# Patient Record
Sex: FEMALE | Race: WHITE | Employment: FULL TIME | URBAN - NONMETROPOLITAN AREA
[De-identification: names, ages, dates, MRNs, and addresses within clinical notes are randomized per-mention and may not be internally consistent; named-entity substitution may affect disease eponyms.]

---

## 2017-09-20 ENCOUNTER — HOSPITAL ENCOUNTER (EMERGENCY)
Age: 42
Discharge: HOME | End: 2017-09-20
Payer: OTHER MISCELLANEOUS

## 2017-09-20 ENCOUNTER — APPOINTMENT (OUTPATIENT)
Dept: CT IMAGING | Age: 42
End: 2017-09-20
Payer: OTHER MISCELLANEOUS

## 2017-09-20 ENCOUNTER — APPOINTMENT (OUTPATIENT)
Dept: GENERAL RADIOLOGY | Age: 42
End: 2017-09-20
Payer: OTHER MISCELLANEOUS

## 2017-09-20 VITALS
TEMPERATURE: 98.1 F | DIASTOLIC BLOOD PRESSURE: 87 MMHG | WEIGHT: 170 LBS | OXYGEN SATURATION: 99 % | HEART RATE: 67 BPM | BODY MASS INDEX: 25.76 KG/M2 | HEIGHT: 68 IN | SYSTOLIC BLOOD PRESSURE: 118 MMHG | RESPIRATION RATE: 18 BRPM

## 2017-09-20 DIAGNOSIS — G44.209 ACUTE NON INTRACTABLE TENSION-TYPE HEADACHE: ICD-10-CM

## 2017-09-20 DIAGNOSIS — M62.838 CERVICAL PARASPINAL MUSCLE SPASM: Primary | ICD-10-CM

## 2017-09-20 PROCEDURE — 99284 EMERGENCY DEPT VISIT MOD MDM: CPT

## 2017-09-20 PROCEDURE — 6360000002 HC RX W HCPCS: Performed by: PHYSICIAN ASSISTANT

## 2017-09-20 PROCEDURE — 70450 CT HEAD/BRAIN W/O DYE: CPT

## 2017-09-20 PROCEDURE — 6370000000 HC RX 637 (ALT 250 FOR IP): Performed by: PHYSICIAN ASSISTANT

## 2017-09-20 PROCEDURE — 72100 X-RAY EXAM L-S SPINE 2/3 VWS: CPT

## 2017-09-20 PROCEDURE — 72072 X-RAY EXAM THORAC SPINE 3VWS: CPT

## 2017-09-20 PROCEDURE — 72125 CT NECK SPINE W/O DYE: CPT

## 2017-09-20 RX ORDER — ONDANSETRON 4 MG/1
4 TABLET, ORALLY DISINTEGRATING ORAL ONCE
Status: COMPLETED | OUTPATIENT
Start: 2017-09-20 | End: 2017-09-20

## 2017-09-20 RX ORDER — METHYLPREDNISOLONE 4 MG/1
TABLET ORAL
Qty: 1 KIT | Refills: 0 | Status: SHIPPED | OUTPATIENT
Start: 2017-09-20 | End: 2017-09-26

## 2017-09-20 RX ORDER — TRAMADOL HYDROCHLORIDE 50 MG/1
50 TABLET ORAL EVERY 6 HOURS PRN
Qty: 20 TABLET | Refills: 0 | Status: SHIPPED | OUTPATIENT
Start: 2017-09-20 | End: 2017-09-30

## 2017-09-20 RX ORDER — CYCLOBENZAPRINE HCL 10 MG
10 TABLET ORAL ONCE
Status: COMPLETED | OUTPATIENT
Start: 2017-09-20 | End: 2017-09-20

## 2017-09-20 RX ORDER — CYCLOBENZAPRINE HCL 10 MG
10 TABLET ORAL 3 TIMES DAILY PRN
Qty: 30 TABLET | Refills: 0 | Status: SHIPPED | OUTPATIENT
Start: 2017-09-20 | End: 2017-09-30

## 2017-09-20 RX ORDER — DIPHENHYDRAMINE HCL 25 MG
25 TABLET ORAL ONCE
Status: COMPLETED | OUTPATIENT
Start: 2017-09-20 | End: 2017-09-20

## 2017-09-20 RX ORDER — TRAMADOL HYDROCHLORIDE 50 MG/1
50 TABLET ORAL ONCE
Status: COMPLETED | OUTPATIENT
Start: 2017-09-20 | End: 2017-09-20

## 2017-09-20 RX ADMIN — TRAMADOL HYDROCHLORIDE 50 MG: 50 TABLET, FILM COATED ORAL at 17:07

## 2017-09-20 RX ADMIN — ONDANSETRON 4 MG: 4 TABLET, ORALLY DISINTEGRATING ORAL at 17:07

## 2017-09-20 RX ADMIN — DIPHENHYDRAMINE HCL 25 MG: 25 TABLET ORAL at 17:07

## 2017-09-20 RX ADMIN — CYCLOBENZAPRINE HYDROCHLORIDE 10 MG: 10 TABLET, FILM COATED ORAL at 17:07

## 2017-09-20 ASSESSMENT — PAIN DESCRIPTION - LOCATION: LOCATION: NECK;BACK

## 2017-09-20 ASSESSMENT — ENCOUNTER SYMPTOMS
EYE PAIN: 0
VOMITING: 0
DIARRHEA: 0
EYE DISCHARGE: 0
WHEEZING: 0
COUGH: 0
BACK PAIN: 1
CONSTIPATION: 0
RHINORRHEA: 0
SORE THROAT: 0
ABDOMINAL PAIN: 0
SHORTNESS OF BREATH: 0
PHOTOPHOBIA: 0
NAUSEA: 0
COLOR CHANGE: 0

## 2017-09-20 ASSESSMENT — PAIN DESCRIPTION - DESCRIPTORS: DESCRIPTORS: ACHING

## 2017-09-20 ASSESSMENT — PAIN SCALES - GENERAL
PAINLEVEL_OUTOF10: 8
PAINLEVEL_OUTOF10: 8

## 2017-09-20 ASSESSMENT — PAIN DESCRIPTION - PAIN TYPE: TYPE: ACUTE PAIN

## 2017-09-20 ASSESSMENT — PAIN DESCRIPTION - ORIENTATION: ORIENTATION: RIGHT;LEFT;UPPER

## 2018-09-14 ENCOUNTER — HOSPITAL ENCOUNTER (EMERGENCY)
Age: 43
Discharge: HOME | End: 2018-09-14
Attending: FAMILY MEDICINE
Payer: COMMERCIAL

## 2018-09-14 ENCOUNTER — APPOINTMENT (OUTPATIENT)
Dept: CT IMAGING | Age: 43
End: 2018-09-14
Payer: COMMERCIAL

## 2018-09-14 VITALS
OXYGEN SATURATION: 97 % | DIASTOLIC BLOOD PRESSURE: 91 MMHG | WEIGHT: 205 LBS | HEIGHT: 68 IN | TEMPERATURE: 98.6 F | HEART RATE: 67 BPM | SYSTOLIC BLOOD PRESSURE: 130 MMHG | BODY MASS INDEX: 31.07 KG/M2 | RESPIRATION RATE: 20 BRPM

## 2018-09-14 DIAGNOSIS — R42 DIZZINESS: Primary | ICD-10-CM

## 2018-09-14 LAB
ANION GAP SERPL CALCULATED.3IONS-SCNC: 14 MEQ/L (ref 8–16)
BASOPHILS # BLD: 0.3 %
BASOPHILS ABSOLUTE: 0 THOU/MM3 (ref 0–0.1)
BUN BLDV-MCNC: 13 MG/DL (ref 7–22)
CALCIUM SERPL-MCNC: 9.3 MG/DL (ref 8.5–10.5)
CHLORIDE BLD-SCNC: 105 MEQ/L (ref 98–111)
CO2: 21 MEQ/L (ref 23–33)
CREAT SERPL-MCNC: 0.8 MG/DL (ref 0.4–1.2)
EKG ATRIAL RATE: 60 BPM
EKG P AXIS: 27 DEGREES
EKG P-R INTERVAL: 126 MS
EKG Q-T INTERVAL: 446 MS
EKG QRS DURATION: 92 MS
EKG QTC CALCULATION (BAZETT): 446 MS
EKG R AXIS: 36 DEGREES
EKG T AXIS: 35 DEGREES
EKG VENTRICULAR RATE: 60 BPM
EOSINOPHIL # BLD: 0.6 %
EOSINOPHILS ABSOLUTE: 0.1 THOU/MM3 (ref 0–0.4)
ERYTHROCYTE [DISTWIDTH] IN BLOOD BY AUTOMATED COUNT: 12.6 % (ref 11.5–14.5)
ERYTHROCYTE [DISTWIDTH] IN BLOOD BY AUTOMATED COUNT: 41.7 FL (ref 35–45)
GFR SERPL CREATININE-BSD FRML MDRD: 77 ML/MIN/1.73M2
GLUCOSE BLD-MCNC: 97 MG/DL (ref 70–108)
HCT VFR BLD CALC: 41.6 % (ref 37–47)
HEMOGLOBIN: 14.7 GM/DL (ref 12–16)
IMMATURE GRANS (ABS): 0.02 THOU/MM3 (ref 0–0.07)
IMMATURE GRANULOCYTES: 0.2 %
LYMPHOCYTES # BLD: 25.5 %
LYMPHOCYTES ABSOLUTE: 2.2 THOU/MM3 (ref 1–4.8)
MCH RBC QN AUTO: 32 PG (ref 26–33)
MCHC RBC AUTO-ENTMCNC: 35.3 GM/DL (ref 32.2–35.5)
MCV RBC AUTO: 90.6 FL (ref 81–99)
MONOCYTES # BLD: 8.9 %
MONOCYTES ABSOLUTE: 0.8 THOU/MM3 (ref 0.4–1.3)
NUCLEATED RED BLOOD CELLS: 0 /100 WBC
OSMOLALITY CALCULATION: 279.4 MOSMOL/KG (ref 275–300)
PLATELET # BLD: 290 THOU/MM3 (ref 130–400)
PMV BLD AUTO: 9.8 FL (ref 9.4–12.4)
POTASSIUM SERPL-SCNC: 4.1 MEQ/L (ref 3.5–5.2)
RBC # BLD: 4.59 MILL/MM3 (ref 4.2–5.4)
SEG NEUTROPHILS: 64.5 %
SEGMENTED NEUTROPHILS ABSOLUTE COUNT: 5.7 THOU/MM3 (ref 1.8–7.7)
SODIUM BLD-SCNC: 140 MEQ/L (ref 135–145)
TROPONIN T: < 0.01 NG/ML
TSH SERPL DL<=0.05 MIU/L-ACNC: 1.85 UIU/ML (ref 0.4–4.2)
WBC # BLD: 8.8 THOU/MM3 (ref 4.8–10.8)

## 2018-09-14 PROCEDURE — 84484 ASSAY OF TROPONIN QUANT: CPT

## 2018-09-14 PROCEDURE — 70450 CT HEAD/BRAIN W/O DYE: CPT

## 2018-09-14 PROCEDURE — 85025 COMPLETE CBC W/AUTO DIFF WBC: CPT

## 2018-09-14 PROCEDURE — 80048 BASIC METABOLIC PNL TOTAL CA: CPT

## 2018-09-14 PROCEDURE — 93225 XTRNL ECG REC<48 HRS REC: CPT

## 2018-09-14 PROCEDURE — 93226 XTRNL ECG REC<48 HR SCAN A/R: CPT

## 2018-09-14 PROCEDURE — 93010 ELECTROCARDIOGRAM REPORT: CPT | Performed by: INTERNAL MEDICINE

## 2018-09-14 PROCEDURE — 99284 EMERGENCY DEPT VISIT MOD MDM: CPT

## 2018-09-14 PROCEDURE — 36415 COLL VENOUS BLD VENIPUNCTURE: CPT

## 2018-09-14 PROCEDURE — 84443 ASSAY THYROID STIM HORMONE: CPT

## 2018-09-14 PROCEDURE — 93005 ELECTROCARDIOGRAM TRACING: CPT | Performed by: FAMILY MEDICINE

## 2018-09-14 ASSESSMENT — ENCOUNTER SYMPTOMS
SHORTNESS OF BREATH: 0
EYE DISCHARGE: 0
BLOOD IN STOOL: 0
ABDOMINAL PAIN: 0
SINUS PRESSURE: 0

## 2018-09-14 NOTE — PROCEDURES
The skin was prepped and a holter monitor was applied. The patient was instructed on the documentation of symptoms and the purpose of the holter as well as the things to avoid while wearing the holter. The patient was instructed to remove and return the holter on 09/15/18.   The serial number of the holter that was applied is [id number]

## 2018-09-14 NOTE — ED PROVIDER NOTES
[facility]  eMERGENCY dEPARTMENT eNCOUnter          CHIEF COMPLAINT       Chief Complaint   Patient presents with    Dizziness       Nurses Notes reviewed and I agree except as noted in the HPI. HISTORY OF PRESENT ILLNESS    Randell Ovalle is a 37 y.o. female who presents to the Emergency Department for the evaluation of dizziness. The patient reports that she has experienced two dizzy spells within the past two weeks. She states that during her dizzy spells that she will suddenly start to experience dizziness and feels as if she is going to commit a syncopal episode. After her dizziness resolves, she states that she experiences left sided numbness, chest heaviness, and an intermittent headache which can last for a duration of minutes to hours. She reports that her numbness will gradually dissipate. She states that her last dizzy spell became present at 0500 today. She denies any fever, chills, tinnitus, chest pain, palpitations, polydipsia, or additional urinary symptoms. She reports to be experiencing normal bowel movements. She denies to smoke. She does not take any medication on a daily basis. She denies any known allergies. She denies any cardiac or thyroid disease. No additional symptoms or complaints at this time. REVIEW OF SYSTEMS     Review of Systems   Constitutional: Negative for diaphoresis and fever. HENT: Negative for congestion and sinus pressure. Eyes: Negative for discharge. Respiratory: Negative for shortness of breath. Nonsmoker   Cardiovascular: Negative for chest pain and palpitations. Gastrointestinal: Negative for abdominal pain and blood in stool. Endocrine: Negative for polydipsia and polyuria. Genitourinary: Negative for difficulty urinating. Musculoskeletal: Negative for joint swelling. Skin: Negative for rash. Neurological: Positive for dizziness. Negative for speech difficulty and weakness.    Hematological: Does not bruise/bleed Nursing note and vitals reviewed. DIFFERENTIAL DIAGNOSIS:   Dizziness unclear cause    Some features may suggest orthostasis    Consider cardiac arrhythmia    Look for anemia, diabetes      DIAGNOSTIC RESULTS     EKG: All EKG's are interpreted by the Emergency Department Physician who either signs or Co-signs this chart in the absence of a cardiologist.    EKG showed Sinus rhythm with rate 60. QRS complexes show Normal axis, Normal conduction. ST-T waves show No acute change            RADIOLOGY: non-plain film images(s) such as CT, Ultrasound and MRI are read by the radiologist.  The patient had a Multiple view CT scan of the brain which demonstrates Normal brain texture. Sinuses are normal.  No acute process per radiology reading      [x] Visualized and interpreted by me   [x] Radiologist's Report Reviewed   [] Discussed with Radiologist.      LABS:   Labs Reviewed   BASIC METABOLIC PANEL - Abnormal; Notable for the following:        Result Value    CO2 21 (*)     All other components within normal limits   GLOMERULAR FILTRATION RATE, ESTIMATED - Abnormal; Notable for the following:     Est, Glom Filt Rate 77 (*)     All other components within normal limits   CBC WITH AUTO DIFFERENTIAL   TSH WITH REFLEX   TROPONIN   ANION GAP   OSMOLALITY       EMERGENCY DEPARTMENT COURSE:   Vitals:    Vitals:    09/14/18 0928 09/14/18 0933 09/14/18 0948   BP: (!) 140/86  (!) 130/91   Pulse: 71     Resp: 18     Temp:  98.6 °F (37 °C)    TempSrc:  Oral    SpO2: 97%     Weight:  205 lb (93 kg)    Height:  5' 8\" (1.727 m)      9:57 AM: The patient was seen and evaluated.     Nursing notes reviewed    Blood pressure 123/71, pulse 60    Blood pressure 122/98, pulse 68 standing    No dizziness    CBC normal    Renal function and electrolytes normal    Troponin normal    TSH normal    We'll place a Holter monitor    No specific explanation for dizziness    Outpatient referral for primary care       CRITICAL CARE:

## 2018-09-18 NOTE — PROCEDURES
After Visit Summary   4/30/2018    Prakash Cramer    MRN: [id number]           Patient Information     Date Of Birth          1943        Visit Information        Provider Department      4/30/2018 2:00 PM Zhou Meraz MD [facility]        Today's Diagnoses     Type 2 diabetes mellitus with diabetic nephropathy, without long-term current use of insulin (H)          Care Instructions    Invokana 100mg tab, 1 tab daily  In AM for diabetes  Nonfasting BMP lab in 3 weeks. Call earlier to nurseline [phone] if increased frequency of urination. Otherwise give update in 4 weeks before running out of medication how sugars are doing. Check AM odd days and bedtime on even days for the 6 days prior to calling  Follow-up with Oncology/hematology  Increased bacteria in colon with yogurt that has active cultures (Greek yogurt, Yoplait)  Call if recurrent diarrhea             Follow-ups after your visit        Your next 10 appointments already scheduled     May 03, 2018 10:00 AM CDT   LAB with LAB FIRST FLOOR [facility] ([facility])    [address] [place] [address] [phone]           Please do not eat 10-12 hours before your appointment if you are coming in fasting for labs on lipids, cholesterol, or glucose (sugar). This does not apply to pregnant women. Water, hot tea and black coffee (with nothing added) are okay. Do not drink other fluids, diet soda or chew gum.            May 07, 2018  4:30 PM CDT   Office Visit with Zhou Meraz MD   [facility] ([facility])    [address] [address] [phone]           Bring a current list of meds and any records pertaining to this visit. For Physicals, please bring immunization records and any forms needing to be filled out. Please arrive 10 minutes early to complete paperwork.            May 08,  [address] [place] [address]                                  HOLTER MONITOR    PATIENT NAME: Aylin Collazo                  :        1975  MED REC NO:   [id number]                           ROOM:       006  ACCOUNT NO:   [de-identified]                           ADMIT DATE: 2018  PROVIDER:     Jefferson Eubanks. Donalee Mangle, M.D. Niki Goldmann 24-HOURS    DATE OF STUDY:  2018    DURATION:  24 hours. QUALITY:  Reasonable. INDICATION:  Irregular heart beat. FINDINGS:  The patient is in normal sinus rhythm. Average heart rate 68  beats per minute, ranging from 44 to 141 beats per minute. Maximum R to R  interval was 1.4 seconds at 08:28 a.m. There are 72 ventricular ectopic  beats, all isolated, with three bigeminy pattern and 22 supraventricular  ectopic beats, all isolated. No other form of arrhythmia. Diary presented  and few entries noted. The dizziness, the patient reported, correlates  with normal sinus rhythm. CONCLUSION:  This is a normal sinus rhythm. Average heart rate 68 beats  per minute ranging from 44 to 141 beats per minute. No significant pause  of more than 1.4 seconds noted. Rare ventricular ectopic beats, total of  72, all isolated. Rare supraventricular ectopic beats, total of 22, all  isolated. No other form of arrhythmia. From the diary, the symptom  correlates with normal sinus rhythm. [facility].  Stefany Farah M.D.    D: 2018 61:34:60       T: 2018 1:26:09     [email or web address]  Job#: [id number]     Doc#: [id number]    CC: "2018  1:30 PM CDT   LAB with LAB ONC [facility] ([facility])    [address] [place] [address] [phone]           Please do not eat 10-12 hours before your appointment if you are coming in fasting for labs on lipids, cholesterol, or glucose (sugar). This does not apply to pregnant women. Water, hot tea and black coffee (with nothing added) are okay. Do not drink other fluids, diet soda or chew gum.            May 08, 2018  2:15 PM CDT   Return Visit with Terra Salazar MD   [facility] ([facility])    [address] [place] [address] [phone]              Who to contact     If you have questions or need follow up information about today's clinic visit or your schedule please contact [facility] directly at [phone].  Normal or non-critical lab and imaging results will be communicated to you by [employer], letter or phone within 4 business days after the clinic has received the results. If you do not hear from us within 7 days, please contact the clinic through NW[employer] or phone. If you have a critical or abnormal lab result, we will notify you by phone as soon as possible.  Submit refill requests through "[employer]" or call your pharmacy and they will forward the refill request to us. Please allow 3 business days for your refill to be completed.          Additional Information About Your Visit        "[employer]" Information     "[employer]" lets you send messages to your doctor, view your test results, renew your prescriptions, schedule appointments and more. To sign up, go to [email or web address]" . Click on \"Log in\" on the left side of the screen, which will take you to the Welcome page. Then click on \"Sign up Now\" on the right side of the page.     You will be asked to enter the access code listed below, as well as some personal information. Please follow the directions to create " your username and password.     Your access code is: [email or web address]  Expires: 2018 12:07 PM     Your access code will  in 90 days. If you need help or a new code, please call your [facility] or [phone].        Care EveryWhere ID     This is your Care EveryWhere ID. This could be used by other organizations to access your [place] medical records  [id number]        Your Vitals Were     Pulse Temperature Respirations BMI (Body Mass Index)          90 98.6  F (37  C) (Oral) 16 26.31 kg/m2         Blood Pressure from Last 3 Encounters:   18 118/66   18 130/77   17 124/68    Weight from Last 3 Encounters:   18 168 lb (76.2 kg)   18 166 lb (75.3 kg)   17 170 lb (77.1 kg)              Today, you had the following     No orders found for display         Today's Medication Changes          These changes are accurate as of 18  2:49 PM.  If you have any questions, ask your nurse or doctor.               Start taking these medicines.        Dose/Directions    canagliflozin 100 MG tablet   Commonly known as:  INVOKANA   Used for:  Type 2 diabetes mellitus with diabetic nephropathy, without long-term current use of insulin (H)   Started by:  Zhou Meraz MD        Dose:  100 mg   Take 1 tablet (100 mg) by mouth every morning (before breakfast)   Quantity:  30 tablet   Refills:  11            Where to get your medicines      These medications were sent to [address]  [id number] [facility] [address]     Phone:  [phone]     blood glucose monitoring lancets    blood glucose monitoring test strip    canagliflozin 100 MG tablet                Primary Care Provider Office Phone # Fax #    Zhou Meraz [phone]       [address] [facility] [address]        Equal Access to Services     ARASH GRACE AH: Hadii tammy persaud Soomaali, waaxda luqadaha, qaybta kaalying berry  florentin juarezloree lenimartha bartlett ah. So [facility] [phone].    ATENCIÓN: Si habla lamar, tiene a stroud disposición servicios gratuitos de asistencia lingüística. Sofy al [phone].    We comply with applicable federal civil rights laws and [place] laws. We do not discriminate on the basis of race, color, national origin, age, disability, sex, sexual orientation, or gender identity.            Thank you!     Thank you for choosing [facility]  for your care. Our goal is always to provide you with excellent care. Hearing back from our patients is one way we can continue to improve our services. Please take a few minutes to complete the written survey that you may receive in the mail after your visit with us. Thank you!             Your Updated Medication List - Protect others around you: Learn how to safely use, store and throw away your medicines at www.disposemymeds.org.          This list is accurate as of 4/30/18  2:49 PM.  Always use your most recent med list.                   Brand Name Dispense Instructions for use Diagnosis    amLODIPine 10 MG tablet    NORVASC    90 tablet    Take 1 tablet (10 mg) by mouth daily    Essential hypertension       aspirin 81 MG tablet     30 tablet    Take by mouth daily    Type 2 diabetes mellitus with diabetic nephropathy, without long-term current use of insulin (H)       B-12 1000 MCG Tbcr     100 tablet    Take 1,000 mcg by mouth daily    Vitamin B12 deficiency (non anemic), Pancytopenia (H)       * blood glucose monitoring lancets     1 Box    Use to test blood sugar 1 time daily or as directed.    Type 2 diabetes mellitus with diabetic nephropathy, without long-term current use of insulin (H)       * blood glucose monitoring lancets     102 each    Use to test blood sugar 1 times daily or as directed.    Type 2 diabetes mellitus with diabetic nephropathy, without long-term current use of insulin (H)       Blood Glucose Monitoring Suppl Ami     1  each    1 Device daily Smartview Meter    Type 2 diabetes mellitus with diabetic nephropathy, without long-term current use of insulin (H)       blood glucose monitoring test strip    ACCU-CHEK SMARTVIEW    100 strip    Use to test blood sugar 1 times daily or as directed.    Type 2 diabetes mellitus with diabetic nephropathy, without long-term current use of insulin (H)       canagliflozin 100 MG tablet    INVOKANA    30 tablet    Take 1 tablet (100 mg) by mouth every morning (before breakfast)    Type 2 diabetes mellitus with diabetic nephropathy, without long-term current use of insulin (H)       levothyroxine 75 MCG tablet    SYNTHROID/LEVOTHROID    30 tablet    TAKE ONE TABLET BY MOUTH ONCE DAILY    Hypothyroidism, unspecified type       metFORMIN 1000 MG tablet    GLUCOPHAGE    180 tablet    1 tab twice a day    Type 2 diabetes mellitus with diabetic nephropathy, without long-term current use of insulin (H)       omeprazole 20 MG CR capsule    priLOSEC    90 capsule    Take 1 capsule (20 mg) by mouth every morning TAKE 30'-60' BEFORE 1ST MEAL DAILY    Gastroesophageal reflux disease without esophagitis       rosuvastatin 40 MG tablet    CRESTOR    45 tablet    Take 0.5 tablets (20 mg) by mouth At Bedtime    Hyperlipidemia LDL goal <100       sitagliptin 100 MG tablet    JANUVIA    90 tablet    Take 1 tablet (100 mg) by mouth daily    Type 2 diabetes mellitus with diabetic nephropathy, without long-term current use of insulin (H)       * Notice:  This list has 2 medication(s) that are the same as other medications prescribed for you. Read the directions carefully, and ask your doctor or other care provider to review them with you.

## 2018-12-21 ENCOUNTER — APPOINTMENT (OUTPATIENT)
Dept: GENERAL RADIOLOGY | Age: 43
End: 2018-12-21
Payer: COMMERCIAL

## 2018-12-21 ENCOUNTER — HOSPITAL ENCOUNTER (EMERGENCY)
Age: 43
Discharge: HOME | End: 2018-12-21
Attending: FAMILY MEDICINE
Payer: COMMERCIAL

## 2018-12-21 VITALS
DIASTOLIC BLOOD PRESSURE: 89 MMHG | HEART RATE: 69 BPM | SYSTOLIC BLOOD PRESSURE: 152 MMHG | OXYGEN SATURATION: 98 % | RESPIRATION RATE: 18 BRPM | TEMPERATURE: 98 F

## 2018-12-21 DIAGNOSIS — K21.9 GASTROESOPHAGEAL REFLUX DISEASE, ESOPHAGITIS PRESENCE NOT SPECIFIED: Primary | ICD-10-CM

## 2018-12-21 LAB
ALBUMIN SERPL-MCNC: 3.8 G/DL (ref 3.5–5.1)
ALP BLD-CCNC: 71 U/L (ref 38–126)
ALT SERPL-CCNC: 8 U/L (ref 11–66)
ANION GAP SERPL CALCULATED.3IONS-SCNC: 12 MEQ/L (ref 8–16)
AST SERPL-CCNC: 11 U/L (ref 5–40)
BACTERIA: ABNORMAL /HPF
BASOPHILS # BLD: 0.6 %
BASOPHILS ABSOLUTE: 0.1 THOU/MM3 (ref 0–0.1)
BILIRUB SERPL-MCNC: 0.2 MG/DL (ref 0.3–1.2)
BILIRUBIN DIRECT: < 0.2 MG/DL (ref 0–0.3)
BILIRUBIN URINE: NEGATIVE
BLOOD, URINE: NEGATIVE
BUN BLDV-MCNC: 18 MG/DL (ref 7–22)
CALCIUM SERPL-MCNC: 9.1 MG/DL (ref 8.5–10.5)
CASTS 2: ABNORMAL /LPF
CASTS UA: ABNORMAL /LPF
CHARACTER, URINE: CLEAR
CHLORIDE BLD-SCNC: 103 MEQ/L (ref 98–111)
CO2: 24 MEQ/L (ref 23–33)
COLOR: YELLOW
CREAT SERPL-MCNC: 0.8 MG/DL (ref 0.4–1.2)
CRYSTALS, UA: ABNORMAL
EKG ATRIAL RATE: 67 BPM
EKG P AXIS: 37 DEGREES
EKG P-R INTERVAL: 118 MS
EKG Q-T INTERVAL: 420 MS
EKG QRS DURATION: 88 MS
EKG QTC CALCULATION (BAZETT): 443 MS
EKG R AXIS: 44 DEGREES
EKG T AXIS: 57 DEGREES
EKG VENTRICULAR RATE: 67 BPM
EOSINOPHIL # BLD: 1.4 %
EOSINOPHILS ABSOLUTE: 0.1 THOU/MM3 (ref 0–0.4)
EPITHELIAL CELLS, UA: ABNORMAL /HPF
ERYTHROCYTE [DISTWIDTH] IN BLOOD BY AUTOMATED COUNT: 12.6 % (ref 11.5–14.5)
ERYTHROCYTE [DISTWIDTH] IN BLOOD BY AUTOMATED COUNT: 43.1 FL (ref 35–45)
GFR SERPL CREATININE-BSD FRML MDRD: 77 ML/MIN/1.73M2
GLUCOSE BLD-MCNC: 106 MG/DL (ref 70–108)
GLUCOSE URINE: NEGATIVE MG/DL
HCT VFR BLD CALC: 41.1 % (ref 37–47)
HEMOGLOBIN: 13.9 GM/DL (ref 12–16)
IMMATURE GRANS (ABS): 0.01 THOU/MM3 (ref 0–0.07)
IMMATURE GRANULOCYTES: 0.1 %
KETONES, URINE: NEGATIVE
LEUKOCYTE ESTERASE, URINE: ABNORMAL
LIPASE: 36.2 U/L (ref 5.6–51.3)
LYMPHOCYTES # BLD: 28.4 %
LYMPHOCYTES ABSOLUTE: 2.7 THOU/MM3 (ref 1–4.8)
MAGNESIUM: 2 MG/DL (ref 1.6–2.4)
MCH RBC QN AUTO: 31.4 PG (ref 26–33)
MCHC RBC AUTO-ENTMCNC: 33.8 GM/DL (ref 32.2–35.5)
MCV RBC AUTO: 92.8 FL (ref 81–99)
MISCELLANEOUS 2: ABNORMAL
MONOCYTES # BLD: 7.7 %
MONOCYTES ABSOLUTE: 0.7 THOU/MM3 (ref 0.4–1.3)
NITRITE, URINE: NEGATIVE
NUCLEATED RED BLOOD CELLS: 0 /100 WBC
OSMOLALITY CALCULATION: 279.9 MOSMOL/KG (ref 275–300)
PH UA: 7
PLATELET # BLD: 286 THOU/MM3 (ref 130–400)
PMV BLD AUTO: 10 FL (ref 9.4–12.4)
POTASSIUM SERPL-SCNC: 4 MEQ/L (ref 3.5–5.2)
PROTEIN UA: NEGATIVE
RBC # BLD: 4.43 MILL/MM3 (ref 4.2–5.4)
RBC URINE: ABNORMAL /HPF
RENAL EPITHELIAL, UA: ABNORMAL
SEG NEUTROPHILS: 61.8 %
SEGMENTED NEUTROPHILS ABSOLUTE COUNT: 5.9 THOU/MM3 (ref 1.8–7.7)
SODIUM BLD-SCNC: 139 MEQ/L (ref 135–145)
SPECIFIC GRAVITY, URINE: 1.02 (ref 1–1.03)
TOTAL PROTEIN: 6.3 G/DL (ref 6.1–8)
TROPONIN T: < 0.01 NG/ML
UROBILINOGEN, URINE: 1 EU/DL
WBC # BLD: 9.5 THOU/MM3 (ref 4.8–10.8)
WBC UA: ABNORMAL /HPF
YEAST: ABNORMAL

## 2018-12-21 PROCEDURE — C9113 INJ PANTOPRAZOLE SODIUM, VIA: HCPCS | Performed by: FAMILY MEDICINE

## 2018-12-21 PROCEDURE — 87077 CULTURE AEROBIC IDENTIFY: CPT

## 2018-12-21 PROCEDURE — 81001 URINALYSIS AUTO W/SCOPE: CPT

## 2018-12-21 PROCEDURE — 6360000002 HC RX W HCPCS: Performed by: FAMILY MEDICINE

## 2018-12-21 PROCEDURE — 83735 ASSAY OF MAGNESIUM: CPT

## 2018-12-21 PROCEDURE — 83690 ASSAY OF LIPASE: CPT

## 2018-12-21 PROCEDURE — 85025 COMPLETE CBC W/AUTO DIFF WBC: CPT

## 2018-12-21 PROCEDURE — 82248 BILIRUBIN DIRECT: CPT

## 2018-12-21 PROCEDURE — 36415 COLL VENOUS BLD VENIPUNCTURE: CPT

## 2018-12-21 PROCEDURE — 87086 URINE CULTURE/COLONY COUNT: CPT

## 2018-12-21 PROCEDURE — 99283 EMERGENCY DEPT VISIT LOW MDM: CPT

## 2018-12-21 PROCEDURE — 6370000000 HC RX 637 (ALT 250 FOR IP): Performed by: FAMILY MEDICINE

## 2018-12-21 PROCEDURE — 80053 COMPREHEN METABOLIC PANEL: CPT

## 2018-12-21 PROCEDURE — 93005 ELECTROCARDIOGRAM TRACING: CPT

## 2018-12-21 PROCEDURE — 96374 THER/PROPH/DIAG INJ IV PUSH: CPT

## 2018-12-21 PROCEDURE — 71046 X-RAY EXAM CHEST 2 VIEWS: CPT

## 2018-12-21 PROCEDURE — 87184 SC STD DISK METHOD PER PLATE: CPT

## 2018-12-21 PROCEDURE — 84484 ASSAY OF TROPONIN QUANT: CPT

## 2018-12-21 PROCEDURE — 87186 SC STD MICRODIL/AGAR DIL: CPT

## 2018-12-21 RX ORDER — PANTOPRAZOLE SODIUM 40 MG/10ML
40 INJECTION, POWDER, LYOPHILIZED, FOR SOLUTION INTRAVENOUS ONCE
Status: COMPLETED | OUTPATIENT
Start: 2018-12-21 | End: 2018-12-21

## 2018-12-21 RX ADMIN — LIDOCAINE HYDROCHLORIDE: 20 SOLUTION ORAL; TOPICAL at 21:59

## 2018-12-21 RX ADMIN — PANTOPRAZOLE SODIUM 40 MG: 40 INJECTION, POWDER, FOR SOLUTION INTRAVENOUS at 21:59

## 2018-12-21 ASSESSMENT — PAIN DESCRIPTION - PAIN TYPE: TYPE: ACUTE PAIN

## 2018-12-21 ASSESSMENT — PAIN DESCRIPTION - ORIENTATION: ORIENTATION: LEFT;UPPER

## 2018-12-21 ASSESSMENT — PAIN DESCRIPTION - LOCATION: LOCATION: ABDOMEN

## 2018-12-21 ASSESSMENT — PAIN SCALES - GENERAL: PAINLEVEL_OUTOF10: 8

## 2018-12-22 ASSESSMENT — ENCOUNTER SYMPTOMS
PHOTOPHOBIA: 0
WHEEZING: 0
BACK PAIN: 0
TROUBLE SWALLOWING: 0
CONSTIPATION: 0
CHEST TIGHTNESS: 0
ABDOMINAL DISTENTION: 0
ABDOMINAL PAIN: 0
VOMITING: 0
DIARRHEA: 0
STRIDOR: 0
SHORTNESS OF BREATH: 0
NAUSEA: 0
SINUS PAIN: 0
SORE THROAT: 0

## 2018-12-24 LAB
ORGANISM: ABNORMAL
URINE CULTURE REFLEX: ABNORMAL

## 2018-12-27 ENCOUNTER — TELEPHONE (OUTPATIENT)
Dept: PHARMACY | Age: 43
End: 2018-12-27

## 2019-07-21 ENCOUNTER — HOSPITAL ENCOUNTER (EMERGENCY)
Dept: GENERAL RADIOLOGY | Age: 44
Discharge: HOME | End: 2019-07-21
Payer: COMMERCIAL

## 2019-07-21 ENCOUNTER — HOSPITAL ENCOUNTER (EMERGENCY)
Age: 44
Discharge: HOME | End: 2019-07-21
Payer: COMMERCIAL

## 2019-07-21 VITALS
BODY MASS INDEX: 31.39 KG/M2 | OXYGEN SATURATION: 96 % | WEIGHT: 200 LBS | SYSTOLIC BLOOD PRESSURE: 141 MMHG | HEART RATE: 70 BPM | RESPIRATION RATE: 16 BRPM | HEIGHT: 67 IN | DIASTOLIC BLOOD PRESSURE: 61 MMHG | TEMPERATURE: 98.1 F

## 2019-07-21 DIAGNOSIS — S93.401A SPRAIN OF RIGHT ANKLE, UNSPECIFIED LIGAMENT, INITIAL ENCOUNTER: Primary | ICD-10-CM

## 2019-07-21 PROCEDURE — 99203 OFFICE O/P NEW LOW 30 MIN: CPT | Performed by: NURSE PRACTITIONER

## 2019-07-21 PROCEDURE — 2709999900 HC NON-CHARGEABLE SUPPLY

## 2019-07-21 PROCEDURE — 99213 OFFICE O/P EST LOW 20 MIN: CPT

## 2019-07-21 PROCEDURE — 73610 X-RAY EXAM OF ANKLE: CPT

## 2019-07-21 ASSESSMENT — ENCOUNTER SYMPTOMS
BACK PAIN: 0
COLOR CHANGE: 1

## 2019-07-21 ASSESSMENT — PAIN DESCRIPTION - LOCATION: LOCATION: ANKLE

## 2019-07-21 ASSESSMENT — PAIN SCALES - GENERAL: PAINLEVEL_OUTOF10: 8

## 2019-07-21 NOTE — ED PROVIDER NOTES
%,Estimated body mass index is 31.32 kg/m² as calculated from the following:    Height as of this encounter: 5' 7\" (1.702 m). Weight as of this encounter: 200 lb (90.7 kg). ,No LMP recorded. Patient has had an ablation. Physical Exam   Constitutional: She is oriented to person, place, and time. Vital signs are normal. She appears well-developed and well-nourished. She is active and cooperative. Non-toxic appearance. She does not have a sickly appearance. She does not appear ill. No distress. HENT:   Head: Normocephalic and atraumatic. Cardiovascular: Normal rate. Pulmonary/Chest: Effort normal.   Musculoskeletal:        Right ankle: She exhibits swelling. She exhibits normal range of motion, no ecchymosis, no deformity, no laceration and normal pulse. Tenderness. Lateral malleolus tenderness found. No medial malleolus, no AITFL, no CF ligament, no posterior TFL, no head of 5th metatarsal and no proximal fibula tenderness found. Neurological: She is alert and oriented to person, place, and time. Skin: Skin is warm, dry and intact. No rash noted. Nursing note and vitals reviewed. DIAGNOSTIC RESULTS     Labs:No results found for this visit on 07/21/19. IMAGING:    XR ANKLE RIGHT (MIN 3 VIEWS)   Final Result   1. No acute process. **This report has been created using voice recognition software. It may contain minor errors which are inherent in voice recognition technology. **      Final report electronically signed by Dr. Kyle Jalloh on 7/21/2019 4:29 PM            EKG:      URGENT CARE COURSE:     Vitals:    07/21/19 1558   BP: (!) 141/61   Pulse: 70   Resp: 16   Temp: 98.1 °F (36.7 °C)   TempSrc: Temporal   SpO2: 96%   Weight: 200 lb (90.7 kg)   Height: 5' 7\" (1.702 m)       Medications - No data to display         PROCEDURES:  None    FINAL IMPRESSION      1.  Sprain of right ankle, unspecified ligament, initial encounter          DISPOSITION/ PLAN     The patient's physical exam

## 2020-05-29 ENCOUNTER — HOSPITAL ENCOUNTER (EMERGENCY)
Age: 45
Discharge: HOME | End: 2020-05-29
Payer: COMMERCIAL

## 2020-05-29 VITALS
DIASTOLIC BLOOD PRESSURE: 86 MMHG | HEIGHT: 67 IN | RESPIRATION RATE: 12 BRPM | OXYGEN SATURATION: 99 % | SYSTOLIC BLOOD PRESSURE: 121 MMHG | HEART RATE: 62 BPM | WEIGHT: 200 LBS | TEMPERATURE: 98.1 F | BODY MASS INDEX: 31.39 KG/M2

## 2020-05-29 LAB
ALBUMIN SERPL-MCNC: 4.1 G/DL (ref 3.5–5.1)
ALP BLD-CCNC: 69 U/L (ref 38–126)
ALT SERPL-CCNC: 10 U/L (ref 11–66)
ANION GAP SERPL CALCULATED.3IONS-SCNC: 9 MEQ/L (ref 8–16)
AST SERPL-CCNC: 13 U/L (ref 5–40)
BASOPHILS # BLD: 0.5 %
BASOPHILS ABSOLUTE: 0.1 THOU/MM3 (ref 0–0.1)
BILIRUB SERPL-MCNC: 0.3 MG/DL (ref 0.3–1.2)
BUN BLDV-MCNC: 16 MG/DL (ref 7–22)
CALCIUM SERPL-MCNC: 9.2 MG/DL (ref 8.5–10.5)
CHLORIDE BLD-SCNC: 103 MEQ/L (ref 98–111)
CO2: 26 MEQ/L (ref 23–33)
CREAT SERPL-MCNC: 1 MG/DL (ref 0.4–1.2)
EKG ATRIAL RATE: 59 BPM
EKG P AXIS: 39 DEGREES
EKG P-R INTERVAL: 132 MS
EKG Q-T INTERVAL: 442 MS
EKG QRS DURATION: 86 MS
EKG QTC CALCULATION (BAZETT): 437 MS
EKG R AXIS: 40 DEGREES
EKG T AXIS: 44 DEGREES
EKG VENTRICULAR RATE: 59 BPM
EOSINOPHIL # BLD: 1.7 %
EOSINOPHILS ABSOLUTE: 0.2 THOU/MM3 (ref 0–0.4)
ERYTHROCYTE [DISTWIDTH] IN BLOOD BY AUTOMATED COUNT: 12.6 % (ref 11.5–14.5)
ERYTHROCYTE [DISTWIDTH] IN BLOOD BY AUTOMATED COUNT: 42.5 FL (ref 35–45)
GFR SERPL CREATININE-BSD FRML MDRD: 59 ML/MIN/1.73M2
GLUCOSE BLD-MCNC: 95 MG/DL (ref 70–108)
HCT VFR BLD CALC: 40.6 % (ref 37–47)
HEMOGLOBIN: 13.9 GM/DL (ref 12–16)
IMMATURE GRANS (ABS): 0.03 THOU/MM3 (ref 0–0.07)
IMMATURE GRANULOCYTES: 0.3 %
LYMPHOCYTES # BLD: 25.3 %
LYMPHOCYTES ABSOLUTE: 2.7 THOU/MM3 (ref 1–4.8)
MCH RBC QN AUTO: 31.7 PG (ref 26–33)
MCHC RBC AUTO-ENTMCNC: 34.2 GM/DL (ref 32.2–35.5)
MCV RBC AUTO: 92.7 FL (ref 81–99)
MONOCYTES # BLD: 7.8 %
MONOCYTES ABSOLUTE: 0.8 THOU/MM3 (ref 0.4–1.3)
NUCLEATED RED BLOOD CELLS: 0 /100 WBC
OSMOLALITY CALCULATION: 276.7 MOSMOL/KG (ref 275–300)
PLATELET # BLD: 296 THOU/MM3 (ref 130–400)
PMV BLD AUTO: 10.2 FL (ref 9.4–12.4)
POTASSIUM SERPL-SCNC: 3.6 MEQ/L (ref 3.5–5.2)
RBC # BLD: 4.38 MILL/MM3 (ref 4.2–5.4)
SEG NEUTROPHILS: 64.4 %
SEGMENTED NEUTROPHILS ABSOLUTE COUNT: 6.9 THOU/MM3 (ref 1.8–7.7)
SODIUM BLD-SCNC: 138 MEQ/L (ref 135–145)
TOTAL PROTEIN: 7.1 G/DL (ref 6.1–8)
TROPONIN T: < 0.01 NG/ML
WBC # BLD: 10.7 THOU/MM3 (ref 4.8–10.8)

## 2020-05-29 PROCEDURE — 93005 ELECTROCARDIOGRAM TRACING: CPT | Performed by: FAMILY MEDICINE

## 2020-05-29 PROCEDURE — 36415 COLL VENOUS BLD VENIPUNCTURE: CPT

## 2020-05-29 PROCEDURE — 99283 EMERGENCY DEPT VISIT LOW MDM: CPT

## 2020-05-29 PROCEDURE — 93010 ELECTROCARDIOGRAM REPORT: CPT | Performed by: INTERNAL MEDICINE

## 2020-05-29 PROCEDURE — 85025 COMPLETE CBC W/AUTO DIFF WBC: CPT

## 2020-05-29 PROCEDURE — 84484 ASSAY OF TROPONIN QUANT: CPT

## 2020-05-29 PROCEDURE — 80053 COMPREHEN METABOLIC PANEL: CPT

## 2020-05-29 NOTE — ED TRIAGE NOTES
Pt to ED via private vehicle w/rprts of lightheadedness and dizziness while at work. Felt \"really hot, like I was going to pass out. \" Work called squad and recommended she were to be evaluated in ER. She then went to urgent care who sent her to ER. Pt rprts symptoms since improved. Pt denies all pains. Rprts intermittent SOB. Pt placed on monitor and in gown. EKG completed.

## 2020-06-02 ASSESSMENT — ENCOUNTER SYMPTOMS
NAUSEA: 0
CHEST TIGHTNESS: 0
TROUBLE SWALLOWING: 0
RHINORRHEA: 0
SINUS PAIN: 0
PHOTOPHOBIA: 0
VOMITING: 0
COLOR CHANGE: 0
COUGH: 0
CONSTIPATION: 0
SORE THROAT: 0
DIARRHEA: 0
ABDOMINAL PAIN: 0
SINUS PRESSURE: 0
BACK PAIN: 0
WHEEZING: 0
SHORTNESS OF BREATH: 0

## 2020-06-02 NOTE — ED PROVIDER NOTES
is not nervous/anxious. PAST MEDICAL HISTORY    has a past medical history of Migraine. SURGICAL HISTORY      has a past surgical history that includes Tonsillectomy and Tubal ligation. CURRENT MEDICATIONS       Discharge Medication List as of 5/29/2020  5:29 PM      CONTINUE these medications which have NOT CHANGED    Details   ibuprofen (ADVIL;MOTRIN) 600 MG tablet Take 1 tablet by mouth every 8 hours as needed for Pain, Disp-30 tablet, R-0Take with food. ALLERGIES     has No Known Allergies. FAMILY HISTORY     has no family status information on file. family history is not on file. SOCIAL HISTORY      reports that she has never smoked. She has never used smokeless tobacco. She reports current alcohol use. She reports that she does not use drugs. PHYSICAL EXAM     INITIAL VITALS:  height is 5' 7\" (1.702 m) and weight is 200 lb (90.7 kg). Her oral temperature is 98.1 °F (36.7 °C). Her blood pressure is 121/86 and her pulse is 62. Her respiration is 12 and oxygen saturation is 99%. Physical Exam  Vitals signs and nursing note reviewed. Constitutional:       General: She is awake. She is not in acute distress. Appearance: Normal appearance. She is well-developed. She is obese. She is not ill-appearing, toxic-appearing or diaphoretic. HENT:      Head: Normocephalic and atraumatic. Right Ear: External ear normal.      Left Ear: External ear normal.      Nose: Nose normal.      Mouth/Throat:      Mouth: Mucous membranes are moist.      Pharynx: Oropharynx is clear. Eyes:      Extraocular Movements: Extraocular movements intact. Pupils: Pupils are equal, round, and reactive to light. Neck:      Musculoskeletal: Normal range of motion and neck supple. No neck rigidity or muscular tenderness. Vascular: No carotid bruit. Cardiovascular:      Rate and Rhythm: Normal rate and regular rhythm. Pulses: Normal pulses.       Heart sounds: Normal heart sounds,

## 2021-12-13 ENCOUNTER — APPOINTMENT (OUTPATIENT)
Dept: GENERAL RADIOLOGY | Age: 46
End: 2021-12-13
Payer: COMMERCIAL

## 2021-12-13 ENCOUNTER — HOSPITAL ENCOUNTER (EMERGENCY)
Age: 46
Discharge: HOME | End: 2021-12-13
Payer: COMMERCIAL

## 2021-12-13 VITALS
WEIGHT: 200 LBS | DIASTOLIC BLOOD PRESSURE: 82 MMHG | BODY MASS INDEX: 31.32 KG/M2 | RESPIRATION RATE: 16 BRPM | TEMPERATURE: 97.9 F | HEART RATE: 98 BPM | SYSTOLIC BLOOD PRESSURE: 136 MMHG | OXYGEN SATURATION: 98 %

## 2021-12-13 DIAGNOSIS — G89.29 CHRONIC PAIN OF LEFT ANKLE: Primary | ICD-10-CM

## 2021-12-13 DIAGNOSIS — M25.572 CHRONIC PAIN OF LEFT ANKLE: Primary | ICD-10-CM

## 2021-12-13 PROCEDURE — 99213 OFFICE O/P EST LOW 20 MIN: CPT

## 2021-12-13 PROCEDURE — 73610 X-RAY EXAM OF ANKLE: CPT

## 2021-12-13 PROCEDURE — 99213 OFFICE O/P EST LOW 20 MIN: CPT | Performed by: EMERGENCY MEDICINE

## 2021-12-13 RX ORDER — PAROXETINE HYDROCHLORIDE 37.5 MG/1
37.5 TABLET, FILM COATED, EXTENDED RELEASE ORAL EVERY MORNING
COMMUNITY

## 2021-12-13 RX ORDER — NAPROXEN 500 MG/1
500 TABLET ORAL 2 TIMES DAILY WITH MEALS
Qty: 60 TABLET | Refills: 0 | OUTPATIENT
Start: 2021-12-13 | End: 2022-07-05

## 2021-12-13 ASSESSMENT — PAIN DESCRIPTION - FREQUENCY: FREQUENCY: CONTINUOUS

## 2021-12-13 ASSESSMENT — ENCOUNTER SYMPTOMS
COUGH: 0
SHORTNESS OF BREATH: 0
COLOR CHANGE: 0

## 2021-12-13 ASSESSMENT — PAIN DESCRIPTION - ORIENTATION: ORIENTATION: LEFT

## 2021-12-13 ASSESSMENT — PAIN SCALES - GENERAL: PAINLEVEL_OUTOF10: 6

## 2021-12-13 ASSESSMENT — PAIN DESCRIPTION - LOCATION: LOCATION: ANKLE

## 2021-12-13 ASSESSMENT — PAIN DESCRIPTION - PROGRESSION: CLINICAL_PROGRESSION: GRADUALLY WORSENING

## 2021-12-13 ASSESSMENT — PAIN - FUNCTIONAL ASSESSMENT: PAIN_FUNCTIONAL_ASSESSMENT: PREVENTS OR INTERFERES SOME ACTIVE ACTIVITIES AND ADLS

## 2021-12-13 ASSESSMENT — PAIN DESCRIPTION - DESCRIPTORS: DESCRIPTORS: SHOOTING;ACHING

## 2021-12-13 ASSESSMENT — PAIN DESCRIPTION - PAIN TYPE: TYPE: ACUTE PAIN

## 2021-12-13 NOTE — Clinical Note
Rufus Tejeda was seen and treated in our emergency department on 12/13/2021. She may return to work on 12/14/2021. If you have any questions or concerns, please don't hesitate to call.       Aquilla Osler, ABIGAIL - CNP

## 2021-12-13 NOTE — ED TRIAGE NOTES
Patient ambulated to room with c/o left a ankle pain and intermittent edema beginning two months ago. Denies known injury.

## 2021-12-13 NOTE — ED PROVIDER NOTES
[facility]  Urgent Care Encounter       CHIEF COMPLAINT       Chief Complaint   Patient presents with    Ankle Pain     left       Nurses Notes reviewed and I agree except as noted in the HPI. HISTORY OF PRESENT ILLNESS   Jeannette Cramer is a 48 y.o. female who presents for complaints of left ankle pain. Patient reports that she injured her ankle approximately 2 months ago. She never got her ankle checked out. She states it acts up on her frequently since initial injury. She states it hurts to bear weight, hurts to pronate left ankle. The pain is always on the medial aspect. She states that she is up for the entire day, she has swelling to the ankle. She has taken Tylenol and ibuprofen for the pain which has helped somewhat. HPI    REVIEW OF SYSTEMS     Review of Systems   Constitutional: Negative for diaphoresis, fatigue and fever. Respiratory: Negative for cough and shortness of breath. Musculoskeletal: Positive for arthralgias (left ankle). Skin: Negative for color change. Neurological: Negative for dizziness and headaches. PAST MEDICAL HISTORY         Diagnosis Date    Anxiety     Migraine        SURGICALHISTORY     Patient  has a past surgical history that includes Tonsillectomy and Tubal ligation. CURRENT MEDICATIONS       Discharge Medication List as of 12/13/2021  8:52 AM      CONTINUE these medications which have NOT CHANGED    Details   PARoxetine (PAXIL CR) 37.5 MG extended release tablet Take 37.5 mg by mouth every morningHistorical Med      ibuprofen (ADVIL;MOTRIN) 600 MG tablet Take 1 tablet by mouth every 8 hours as needed for Pain, Disp-30 tablet, R-0Take with food. ALLERGIES     Patient is has No Known Allergies. Patients   There is no immunization history on file for this patient. FAMILY HISTORY     Patient's family history is not on file. SOCIAL HISTORY     Patient  reports that she has never smoked.  She has never used smokeless tobacco. She reports current alcohol use. She reports that she does not use drugs. PHYSICAL EXAM     ED TRIAGE VITALS  BP: 136/82, Temp: 97.9 °F (36.6 °C), Pulse: 98, Resp: 16, SpO2: 98 %,Estimated body mass index is 31.32 kg/m² as calculated from the following:    Height as of 5/29/20: 5' 7\" (1.702 m). Weight as of this encounter: 200 lb (90.7 kg). ,No LMP recorded. Patient has had an ablation. Physical Exam  Constitutional:       General: She is not in acute distress. Appearance: She is normal weight. She is not ill-appearing. Cardiovascular:      Rate and Rhythm: Normal rate. Pulses: Normal pulses. Pulmonary:      Effort: Pulmonary effort is normal.      Breath sounds: Normal breath sounds. Musculoskeletal:        Feet:       Comments: Tenderness to the medial aspect of the left ankle. Along the ligaments of the left ankle. Ankle mortise is stable. No medial malleoli or tenderness   Skin:     General: Skin is dry. Capillary Refill: Capillary refill takes less than 2 seconds. Neurological:      General: No focal deficit present. Mental Status: She is alert. Psychiatric:         Mood and Affect: Mood normal.         Behavior: Behavior normal.         DIAGNOSTIC RESULTS     Labs:No results found for this visit on 12/13/21. IMAGING:    XR ANKLE LEFT (MIN 3 VIEWS)   Final Result   Soft tissue swelling with no acute fracture or dislocation. **This report has been created using voice recognition software. It may contain minor errors which are inherent in voice recognition technology. **      Final report electronically signed by Dr Darrel Gutierrez on 12/13/2021 8:45 AM            EKG:      URGENT CARE COURSE:     Vitals:    12/13/21 0813   BP: 136/82   Pulse: 98   Resp: 16   Temp: 97.9 °F (36.6 °C)   TempSrc: Temporal   SpO2: 98%   Weight: 200 lb (90.7 kg)       Medications - No data to display         PROCEDURES:  None    FINAL IMPRESSION      1.  Chronic pain of left ankle          DISPOSITION/ PLAN     Patient presents for what is now becoming chronic ankle pain. X-rays were performed and negative. I will prescribe naproxen for relief of pain. Advised to follow-up with podiatrist or orthopedist if symptoms continue. Return as needed. PATIENT REFERRED TO:  No primary care provider on file. No primary physician on file.       DISCHARGE MEDICATIONS:  Discharge Medication List as of 12/13/2021  8:52 AM      START taking these medications    Details   naproxen (NAPROSYN) 500 MG tablet Take 1 tablet by mouth 2 times daily (with meals), Disp-60 tablet, R-0Normal             Discharge Medication List as of 12/13/2021  8:52 AM          Discharge Medication List as of 12/13/2021  8:52 AM          ABIGAIL Gaytan CNP    (Please note that portions of this note were completed with a voice recognition program. Efforts were made to edit the dictations but occasionally words are mis-transcribed.)          ABIGAIL Gaytan CNP  12/13/21 1460

## 2022-03-01 ENCOUNTER — HOSPITAL ENCOUNTER (EMERGENCY)
Age: 47
Discharge: HOME | End: 2022-03-01
Payer: COMMERCIAL

## 2022-03-01 VITALS
HEIGHT: 67 IN | RESPIRATION RATE: 16 BRPM | BODY MASS INDEX: 31.39 KG/M2 | OXYGEN SATURATION: 95 % | TEMPERATURE: 97.8 F | WEIGHT: 200 LBS | SYSTOLIC BLOOD PRESSURE: 173 MMHG | DIASTOLIC BLOOD PRESSURE: 79 MMHG | HEART RATE: 78 BPM

## 2022-03-01 DIAGNOSIS — M70.72 BURSITIS OF LEFT HIP, UNSPECIFIED BURSA: Primary | ICD-10-CM

## 2022-03-01 PROCEDURE — 6360000002 HC RX W HCPCS: Performed by: NURSE PRACTITIONER

## 2022-03-01 PROCEDURE — 99213 OFFICE O/P EST LOW 20 MIN: CPT

## 2022-03-01 PROCEDURE — 96372 THER/PROPH/DIAG INJ SC/IM: CPT

## 2022-03-01 PROCEDURE — 99213 OFFICE O/P EST LOW 20 MIN: CPT | Performed by: NURSE PRACTITIONER

## 2022-03-01 RX ORDER — KETOROLAC TROMETHAMINE 30 MG/ML
60 INJECTION, SOLUTION INTRAMUSCULAR; INTRAVENOUS ONCE
Status: COMPLETED | OUTPATIENT
Start: 2022-03-01 | End: 2022-03-01

## 2022-03-01 RX ORDER — PREDNISONE 20 MG/1
40 TABLET ORAL DAILY
Qty: 14 TABLET | Refills: 0 | Status: SHIPPED | OUTPATIENT
Start: 2022-03-01 | End: 2022-03-08

## 2022-03-01 RX ORDER — CYCLOBENZAPRINE HCL 10 MG
10 TABLET ORAL 3 TIMES DAILY PRN
Qty: 15 TABLET | Refills: 0 | Status: SHIPPED | OUTPATIENT
Start: 2022-03-01 | End: 2022-07-05

## 2022-03-01 RX ADMIN — KETOROLAC TROMETHAMINE 60 MG: 30 INJECTION, SOLUTION INTRAMUSCULAR at 17:41

## 2022-03-01 ASSESSMENT — PAIN SCALES - GENERAL: PAINLEVEL_OUTOF10: 8

## 2022-03-01 ASSESSMENT — ENCOUNTER SYMPTOMS
VOMITING: 0
NAUSEA: 0
SHORTNESS OF BREATH: 0

## 2022-03-01 NOTE — ED PROVIDER NOTES
[facility]  Urgent Care Encounter       CHIEF COMPLAINT       Chief Complaint   Patient presents with    Hip Pain     left       Nurses Notes reviewed and I agree except as noted in the HPI. HISTORY OF PRESENT ILLNESS   Eleni Wilson is a 48 y.o. female who presents for evaluation of left hip pain that is been ongoing for the past week. Patient denies any injury or trauma. She denies any pain in the back or numbness/tingling going down her leg. She states that she does get up and down quite a bit while at work. She states that she has been taking Naprosyn intermittently but has not taken any medications today. She denies any other medications or interventions. The history is provided by the patient. REVIEW OF SYSTEMS     Review of Systems   Constitutional: Negative for chills and fever. Respiratory: Negative for shortness of breath. Cardiovascular: Negative for chest pain. Gastrointestinal: Negative for nausea and vomiting. Musculoskeletal: Positive for arthralgias. Negative for gait problem and myalgias. Skin: Negative for rash. Neurological: Negative for weakness and numbness. Hematological: Does not bruise/bleed easily. PAST MEDICAL HISTORY         Diagnosis Date    Anxiety     Migraine        SURGICALHISTORY     Patient  has a past surgical history that includes Tonsillectomy and Tubal ligation. CURRENT MEDICATIONS       Previous Medications    IBUPROFEN (ADVIL;MOTRIN) 600 MG TABLET    Take 1 tablet by mouth every 8 hours as needed for Pain    NAPROXEN (NAPROSYN) 500 MG TABLET    Take 1 tablet by mouth 2 times daily (with meals)    PAROXETINE (PAXIL CR) 37.5 MG EXTENDED RELEASE TABLET    Take 37.5 mg by mouth every morning       ALLERGIES     Patient is has No Known Allergies. Patients   There is no immunization history on file for this patient. FAMILY HISTORY     Patient's family history is not on file.     SOCIAL HISTORY Patient  reports that she has never smoked. She has never used smokeless tobacco. She reports current alcohol use. She reports that she does not use drugs. PHYSICAL EXAM     ED TRIAGE VITALS  BP: (!) 173/79, Temp: 97.8 °F (36.6 °C), Pulse: 78, Resp: 16, SpO2: 95 %,Estimated body mass index is 31.32 kg/m² as calculated from the following:    Height as of this encounter: 5' 7\" (1.702 m). Weight as of this encounter: 200 lb (90.7 kg). ,No LMP recorded. Patient has had an ablation. Physical Exam  Vitals and nursing note reviewed. Constitutional:       General: She is not in acute distress. Appearance: She is well-developed. She is not diaphoretic. Eyes:      Conjunctiva/sclera:      Right eye: Right conjunctiva is not injected. Left eye: Left conjunctiva is not injected. Pupils: Pupils are equal.   Cardiovascular:      Rate and Rhythm: Normal rate and regular rhythm. Heart sounds: No murmur heard. Pulmonary:      Effort: Pulmonary effort is normal. No respiratory distress. Breath sounds: Normal breath sounds. Musculoskeletal:      Cervical back: Normal range of motion. Left hip: Bony tenderness present. No crepitus. Normal range of motion. Normal strength. Right knee: Normal range of motion. Left knee: Normal range of motion. Legs:    Skin:     General: Skin is warm. Findings: No rash. Neurological:      Mental Status: She is alert and oriented to person, place, and time. Psychiatric:         Behavior: Behavior normal.         DIAGNOSTIC RESULTS     Labs:No results found for this visit on 03/01/22.     IMAGING:    No orders to display         EKG:      URGENT CARE COURSE:     Vitals:    03/01/22 1730 03/01/22 1731   BP:  (!) 173/79   Pulse: 78    Resp: 16    Temp: 97.8 °F (36.6 °C)    TempSrc: Temporal    SpO2: 95%    Weight: 200 lb (90.7 kg)    Height: 5' 7\" (1.702 m)        Medications   ketorolac (TORADOL) injection 60 mg (60 mg IntraMUSCular

## 2022-03-01 NOTE — ED NOTES
To [facility] with complaints of left hip pain.  Started about 2 weeks ago, getting worse     Gena Enamorado RN  03/01/22 5383

## 2022-07-05 ENCOUNTER — HOSPITAL ENCOUNTER (EMERGENCY)
Age: 47
Discharge: HOME | End: 2022-07-05
Payer: COMMERCIAL

## 2022-07-05 ENCOUNTER — APPOINTMENT (OUTPATIENT)
Dept: GENERAL RADIOLOGY | Age: 47
End: 2022-07-05
Payer: COMMERCIAL

## 2022-07-05 VITALS
RESPIRATION RATE: 19 BRPM | DIASTOLIC BLOOD PRESSURE: 82 MMHG | BODY MASS INDEX: 32.18 KG/M2 | WEIGHT: 205 LBS | HEIGHT: 67 IN | SYSTOLIC BLOOD PRESSURE: 126 MMHG | HEART RATE: 69 BPM | TEMPERATURE: 97.8 F | OXYGEN SATURATION: 98 %

## 2022-07-05 DIAGNOSIS — M25.552 LEFT HIP PAIN: Primary | ICD-10-CM

## 2022-07-05 PROCEDURE — 99213 OFFICE O/P EST LOW 20 MIN: CPT

## 2022-07-05 PROCEDURE — 99213 OFFICE O/P EST LOW 20 MIN: CPT | Performed by: EMERGENCY MEDICINE

## 2022-07-05 PROCEDURE — 73502 X-RAY EXAM HIP UNI 2-3 VIEWS: CPT

## 2022-07-05 RX ORDER — PREDNISONE 20 MG/1
40 TABLET ORAL DAILY
Qty: 10 TABLET | Refills: 0 | Status: SHIPPED | OUTPATIENT
Start: 2022-07-05 | End: 2022-07-10

## 2022-07-05 RX ORDER — CYCLOBENZAPRINE HCL 10 MG
10 TABLET ORAL 3 TIMES DAILY PRN
Qty: 21 TABLET | Refills: 0 | Status: SHIPPED | OUTPATIENT
Start: 2022-07-05 | End: 2022-07-15

## 2022-07-05 ASSESSMENT — ENCOUNTER SYMPTOMS
SHORTNESS OF BREATH: 0
COUGH: 0
COLOR CHANGE: 0

## 2022-07-05 NOTE — Clinical Note
Philly Yary was seen and treated in our emergency department on 7/5/2022. She may return to work on 07/06/2022. Sit down work only until 7/09/22     If you have any questions or concerns, please don't hesitate to call.       Tho Red, ABIGAIL - CNP

## 2022-07-05 NOTE — ED PROVIDER NOTES
[facility]  Urgent Care Encounter       CHIEF COMPLAINT       Chief Complaint   Patient presents with    Hip Pain       Nurses Notes reviewed and I agree except as noted in the HPI. HISTORY OF PRESENT ILLNESS   Dolores Ren is a 50 y.o. female who presents for complaints of left hip pain. Patient states that hip has been hurting for the past month or 2. She takes ibuprofen with minimal improvement of pain. She states they recently moved things around at work and she now stands all day instead of sets. She states standing aggravates her pain. She currently rates her pain 8 on a 10 scale. States any movement of the left hip seems to increase her pain. Patient denies any falls or injury. She states she is starting to develop some minimal low back pain but feels that this is related to favoring her hip and not likely the cause of her pain. HPI    REVIEW OF SYSTEMS     Review of Systems   Constitutional: Negative for activity change. Respiratory: Negative for cough and shortness of breath. Musculoskeletal: Positive for arthralgias (left hip) and gait problem. Skin: Negative for color change. Neurological: Negative for dizziness and headaches. Psychiatric/Behavioral: Negative for behavioral problems. PAST MEDICAL HISTORY         Diagnosis Date    Anxiety     Migraine        SURGICALHISTORY     Patient  has a past surgical history that includes Tonsillectomy and Tubal ligation. CURRENT MEDICATIONS       Discharge Medication List as of 7/5/2022  5:56 PM      CONTINUE these medications which have NOT CHANGED    Details   PARoxetine (PAXIL CR) 37.5 MG extended release tablet Take 37.5 mg by mouth every morningHistorical Med             ALLERGIES     Patient is has No Known Allergies. Patients   There is no immunization history on file for this patient. FAMILY HISTORY     Patient's family history is not on file.     SOCIAL HISTORY     Patient  reports that she has never smoked. She has never used smokeless tobacco. She reports current alcohol use. She reports that she does not use drugs. PHYSICAL EXAM     ED TRIAGE VITALS  BP: 126/82, Temp: 97.8 °F (36.6 °C), Heart Rate: 69, Resp: 19, SpO2: 98 %,Estimated body mass index is 32.11 kg/m² as calculated from the following:    Height as of this encounter: 5' 7\" (1.702 m). Weight as of this encounter: 205 lb (93 kg). ,No LMP recorded. Patient has had an ablation. Physical Exam  Constitutional:       General: She is not in acute distress. Appearance: Normal appearance. HENT:      Head: Normocephalic. Cardiovascular:      Rate and Rhythm: Normal rate and regular rhythm. Pulses: Normal pulses. Heart sounds: Normal heart sounds. Pulmonary:      Effort: Pulmonary effort is normal.      Breath sounds: Normal breath sounds. Musculoskeletal:      Cervical back: Normal.      Thoracic back: Normal.      Lumbar back: No tenderness or bony tenderness. Left hip: Tenderness and bony tenderness present. Legs:       Comments: Tenderness over the trochanter of the left hip. Pain increases with flexion of the hip. Pain increases with weightbearing on the left hip   Skin:     General: Skin is warm and dry. Capillary Refill: Capillary refill takes less than 2 seconds. Neurological:      General: No focal deficit present. Mental Status: She is alert. DIAGNOSTIC RESULTS     Labs:No results found for this visit on 07/05/22. IMAGING:    XR HIP LEFT (2-3 VIEWS)   Final Result   1. No radiographic evidence of acute injury to the left hip. No    significant arthritic change. This document has been electronically signed by:  Yannick Carpenter MD on    07/05/2022 06:04 PM            EKG:      URGENT CARE COURSE:     Vitals:    07/05/22 1722   BP: 126/82   Pulse: 69   Resp: 19   Temp: 97.8 °F (36.6 °C)   SpO2: 98%   Weight: 205 lb (93 kg)   Height: 5' 7\" (1.702 m)       Medications - No data to display         PROCEDURES:  None    FINAL IMPRESSION      1. Left hip pain          DISPOSITION/ PLAN   Patient presents for left hip pain. No injury. X-rays were performed and negative. This could be trochanter bursitis. Will place patient on prednisone, Flexeril for treatment of symptoms. Gentle stretching. Ice frequently. Follow-up with family physician or orthopedics if no improvement 5 to 7 days. I did write the patient sitdown work only if her facility will allow.         PATIENT REFERRED TO:  Mao Salgado MD  [address] [facility] / [facility] [place] [address]      DISCHARGE MEDICATIONS:  Discharge Medication List as of 7/5/2022  5:56 PM      START taking these medications    Details   predniSONE (DELTASONE) 20 MG tablet Take 2 tablets by mouth daily for 5 days, Disp-10 tablet, R-0Normal             Discharge Medication List as of 7/5/2022  5:56 PM      STOP taking these medications       naproxen (NAPROSYN) 500 MG tablet Comments:   Reason for Stopping:         ibuprofen (ADVIL;MOTRIN) 600 MG tablet Comments:   Reason for Stopping:               Discharge Medication List as of 7/5/2022  5:56 PM      CONTINUE these medications which have CHANGED    Details   cyclobenzaprine (FLEXERIL) 10 MG tablet Take 1 tablet by mouth 3 times daily as needed for Muscle spasms, Disp-21 tablet, R-0Normal             ABIGAIL Rodrigues CNP    (Please note that portions of this note were completed with a voice recognition program. Efforts were made to edit the dictations but occasionally words are mis-transcribed.)          ABIGAIL Rodrigues CNP  07/05/22 0620

## 2023-01-25 ENCOUNTER — HOSPITAL ENCOUNTER (EMERGENCY)
Age: 48
Discharge: HOME | End: 2023-01-25
Attending: FAMILY MEDICINE

## 2023-01-25 ENCOUNTER — APPOINTMENT (OUTPATIENT)
Dept: GENERAL RADIOLOGY | Age: 48
End: 2023-01-25

## 2023-01-25 VITALS
BODY MASS INDEX: 31.39 KG/M2 | OXYGEN SATURATION: 98 % | RESPIRATION RATE: 16 BRPM | DIASTOLIC BLOOD PRESSURE: 81 MMHG | HEIGHT: 67 IN | SYSTOLIC BLOOD PRESSURE: 134 MMHG | TEMPERATURE: 98.3 F | HEART RATE: 64 BPM | WEIGHT: 200 LBS

## 2023-01-25 DIAGNOSIS — S93.401A MODERATE RIGHT ANKLE SPRAIN, INITIAL ENCOUNTER: Primary | ICD-10-CM

## 2023-01-25 PROCEDURE — 73610 X-RAY EXAM OF ANKLE: CPT

## 2023-01-25 PROCEDURE — 99283 EMERGENCY DEPT VISIT LOW MDM: CPT

## 2023-01-25 RX ORDER — DICLOFENAC SODIUM 75 MG/1
75 TABLET, DELAYED RELEASE ORAL 2 TIMES DAILY PRN
Qty: 14 TABLET | Refills: 0 | Status: SHIPPED | OUTPATIENT
Start: 2023-01-25 | End: 2023-02-01

## 2023-01-25 ASSESSMENT — PAIN DESCRIPTION - DESCRIPTORS: DESCRIPTORS: SORE;SHARP;STABBING

## 2023-01-25 ASSESSMENT — PAIN DESCRIPTION - LOCATION: LOCATION: ANKLE

## 2023-01-25 ASSESSMENT — PAIN SCALES - GENERAL: PAINLEVEL_OUTOF10: 8

## 2023-01-25 ASSESSMENT — PAIN DESCRIPTION - ORIENTATION: ORIENTATION: RIGHT

## 2023-01-25 ASSESSMENT — PAIN DESCRIPTION - FREQUENCY: FREQUENCY: CONTINUOUS

## 2023-01-25 ASSESSMENT — PAIN - FUNCTIONAL ASSESSMENT: PAIN_FUNCTIONAL_ASSESSMENT: 0-10

## 2023-01-25 ASSESSMENT — PAIN DESCRIPTION - ONSET: ONSET: ON-GOING

## 2023-01-25 ASSESSMENT — PAIN DESCRIPTION - PAIN TYPE: TYPE: ACUTE PAIN

## 2023-01-25 NOTE — ED NOTES
Pt presents to ED for ankle pain and swelling for 2 months. Pt states the pain has been progressively worse and is at the point she cant stand and interferes with work. Pt appears to be resting comfortably in the chair, call light in hand, denies further need. MARCELLUS Mayen  01/25/23 1146

## 2023-01-25 NOTE — ED PROVIDER NOTES
[facility]  eMERGENCY dEPARTMENT eNCOUnter          CHIEF COMPLAINT       Chief Complaint   Patient presents with    Ankle Pain     right       Nurses Notes reviewed and I agree except as noted in the HPI. HISTORY OF PRESENT ILLNESS    Jeannette Cramer is a 50 y.o. female who presents to the Emergency Department for the evaluation of right ankle pain. Patient reports that symptoms have been going on for several months and the pain has been persistent. She denies any specific injury. She reports that when she wears boots there is worsening of pain. She reports that she has tried ibuprofen, Tylenol arthritis, lidocaine patches without any relief. The HPI was provided by the patient. REVIEW OF SYSTEMS     Review of Systems   All other systems reviewed and are negative. MEDICAL HISTORY    has a past medical history of Anxiety and Migraine. SURGICAL HISTORY      has a past surgical history that includes Tonsillectomy and Tubal ligation. CURRENT MEDICATIONS       Discharge Medication List as of 1/25/2023 12:31 PM        CONTINUE these medications which have NOT CHANGED    Details   PARoxetine (PAXIL CR) 37.5 MG extended release tablet Take 37.5 mg by mouth every morningHistorical Med             ALLERGIES     has No Known Allergies. FAMILY HISTORY     has no family status information on file. family history is not on file. SOCIAL HISTORY      reports that she has never smoked. She has never used smokeless tobacco. She reports current alcohol use. She reports that she does not use drugs. PHYSICAL EXAM     INITIAL VITALS:  height is 5' 7\" (1.702 m) and weight is 200 lb (90.7 kg). Her oral temperature is 98.3 °F (36.8 °C). Her blood pressure is 134/81 and her pulse is 64. Her respiration is 16 and oxygen saturation is 98%. Physical Exam  Constitutional:       Appearance: Normal appearance. HENT:      Head: Normocephalic and atraumatic.    Musculoskeletal:      Right ankle: Swelling present. Tenderness present. Legs:    Neurological:      Mental Status: She is alert. DIFFERENTIAL DIAGNOSIS:   Distal fibula fracture  Distal tibia fracture  Calcaneal fracture  Ligamentous sprain    DIAGNOSTICRESULTS     EKG: All EKG's are interpreted by the Emergency Department Physician who either signs or Co-signs this chart in the absence of acardiologist.  EKG interpreted by Brenden Dickinson MD:        RADIOLOGY: non-plain film images(s) such as CT, Ultrasound and MRI are read by the radiologist.    XR ANKLE RIGHT (MIN 3 VIEWS)   Final Result   1. No acute bony abnormality            **This report has been created using voice recognition software. It may contain minor errors which are inherent in voice recognition technology. **      Final report electronically signed by Dr Slade Olivares on 1/25/2023 12:20 PM          LABS:   Labs Reviewed - No data to display    EMERGENCYDEPARTMENT COURSE:   Vitals:    Vitals:    01/25/23 1143   BP: 134/81   Pulse: 64   Resp: 16   Temp: 98.3 °F (36.8 °C)   TempSrc: Oral   SpO2: 98%   Weight: 200 lb (90.7 kg)   Height: 5' 7\" (1.702 m)       [facility]      11:52 AM EST: The patient was seen and evaluated. ED Course as of 01/25/23 1434   Wed Jan 25, 2023   1150 We will evaluate with an x-ray of the right ankle [SM]   1227 Vital signs are stable. Patient was discharged home in stable condition on anti-inflammatories. She is advised to follow-up with podiatrist. [SM]      ED Course User Index  [SM] Brenden Dickinson MD         CRITICAL CARE:        CONSULTS:  None    PROCEDURES:  None     FINAL IMPRESSION      1.  Moderate right ankle sprain, initial encounter          DISPOSITION/PLAN   Discharge    PATIENT REFERRED TO:  Florine Scheuermann, DPM  [address] [facility]  [phone]          DISCHARGE MEDICATIONS:  Discharge Medication List as of 1/25/2023 12:31 PM        START taking these medications    Details   diclofenac (VOLTAREN) 75 MG EC tablet Take 1 tablet by mouth 2 times daily as needed for Pain, Disp-14 tablet, R-0Normal             (Please note that portions of this note were completed with a voice recognition program.  Efforts were made to edit thedictations but occasionally words are mis-transcribed.)    Sarah Izaguirre MD 1/25/23 2:34 PM                            Sarah Izaguirre MD  01/25/23 5428

## 2023-09-04 ENCOUNTER — HOSPITAL ENCOUNTER (EMERGENCY)
Age: 48
Discharge: HOME | End: 2023-09-04
Payer: COMMERCIAL

## 2023-09-04 VITALS
TEMPERATURE: 98 F | HEART RATE: 77 BPM | RESPIRATION RATE: 16 BRPM | SYSTOLIC BLOOD PRESSURE: 141 MMHG | DIASTOLIC BLOOD PRESSURE: 80 MMHG | OXYGEN SATURATION: 98 %

## 2023-09-04 DIAGNOSIS — J02.9 SORE THROAT: Primary | ICD-10-CM

## 2023-09-04 LAB — S PYO AG THROAT QL: NEGATIVE

## 2023-09-04 PROCEDURE — 99213 OFFICE O/P EST LOW 20 MIN: CPT

## 2023-09-04 PROCEDURE — 87651 STREP A DNA AMP PROBE: CPT

## 2023-09-04 RX ORDER — AMOXICILLIN 875 MG/1
875 TABLET, COATED ORAL 2 TIMES DAILY
Qty: 20 TABLET | Refills: 0 | Status: SHIPPED | OUTPATIENT
Start: 2023-09-04 | End: 2023-09-14

## 2023-09-04 ASSESSMENT — ENCOUNTER SYMPTOMS
EYE ITCHING: 0
VOMITING: 0
SINUS PRESSURE: 0
CONSTIPATION: 0
EYE DISCHARGE: 0
NAUSEA: 0
SINUS PAIN: 0
CHEST TIGHTNESS: 0
SORE THROAT: 1
SHORTNESS OF BREATH: 0
ABDOMINAL PAIN: 0
DIARRHEA: 0
BACK PAIN: 0
ABDOMINAL DISTENTION: 0
COUGH: 0

## 2023-09-04 NOTE — ED NOTES
To [facility] with complaints of sore throat. Concerned for strep as she had her son in here a few days ago with strep. Pt on chemo for breast cancer.       Luis Malcolm RN  09/04/23 5618

## 2023-09-04 NOTE — ED PROVIDER NOTES
[address]  Urgent Care Encounter      CHIEF COMPLAINT       Chief Complaint   Patient presents with    Pharyngitis       Nurses Notes reviewed and I agree except as noted in the HPI. HISTORY OF PRESENT ILLNESS   Rachel Tipton is a 46 y.o. female who presents to urgent care with complaints of sore throat. Patient reports her symptoms started 1 day ago. Patient reports she recently had her son assessed this week for pharyngitis and he was diagnosed with strep throat. Patient reports pain with swallowing and has lost her voice. Patient reports she also is on chemotherapy for breast cancer. Patient denies fevers at this time. Patient denies chest pain, shortness of breath, abdominal pain. Patient reports her only symptom at this time is sore throat. REVIEW OF SYSTEMS     Review of Systems   Constitutional:  Negative for chills, diaphoresis, fatigue and fever. HENT:  Positive for sore throat. Negative for congestion, dental problem, drooling, ear discharge, mouth sores, nosebleeds, sinus pressure and sinus pain. Eyes:  Negative for discharge and itching. Respiratory:  Negative for cough, chest tightness and shortness of breath. Cardiovascular:  Negative for chest pain. Gastrointestinal:  Negative for abdominal distention, abdominal pain, constipation, diarrhea, nausea and vomiting. Genitourinary:  Negative for difficulty urinating, dyspareunia, dysuria and flank pain. Musculoskeletal:  Negative for arthralgias, back pain, gait problem and joint swelling. Neurological:  Negative for dizziness, seizures, light-headedness and numbness. PAST MEDICAL HISTORY         Diagnosis Date    Anxiety     Cancer [facility])     Migraine        SURGICAL HISTORY     Patient  has a past surgical history that includes Tonsillectomy and Tubal ligation.     CURRENT MEDICATIONS       Previous Medications    DICLOFENAC (VOLTAREN) 75 MG EC TABLET    Take 1 tablet by mouth 2 times daily as

## 2023-11-09 ENCOUNTER — HOSPITAL ENCOUNTER (EMERGENCY)
Age: 48
Discharge: HOME | End: 2023-11-09
Attending: EMERGENCY MEDICINE
Payer: COMMERCIAL

## 2023-11-09 VITALS
SYSTOLIC BLOOD PRESSURE: 132 MMHG | TEMPERATURE: 99.2 F | OXYGEN SATURATION: 96 % | RESPIRATION RATE: 16 BRPM | WEIGHT: 200 LBS | BODY MASS INDEX: 31.32 KG/M2 | HEART RATE: 88 BPM | DIASTOLIC BLOOD PRESSURE: 76 MMHG

## 2023-11-09 DIAGNOSIS — T45.1X5D CHEMOTHERAPY ADVERSE REACTION, SUBSEQUENT ENCOUNTER: Primary | ICD-10-CM

## 2023-11-09 LAB
ALBUMIN SERPL BCG-MCNC: 4.4 G/DL (ref 3.5–5.1)
ALP SERPL-CCNC: 64 U/L (ref 38–126)
ALT SERPL W/O P-5'-P-CCNC: 49 U/L (ref 11–66)
ANION GAP SERPL CALC-SCNC: 13 MEQ/L (ref 8–16)
AST SERPL-CCNC: 19 U/L (ref 5–40)
BASOPHILS ABSOLUTE: 0 THOU/MM3 (ref 0–0.1)
BASOPHILS NFR BLD AUTO: 0.5 %
BILIRUB CONJ SERPL-MCNC: < 0.2 MG/DL (ref 0–0.3)
BILIRUB SERPL-MCNC: 0.4 MG/DL (ref 0.3–1.2)
BUN SERPL-MCNC: 19 MG/DL (ref 7–22)
CALCIUM SERPL-MCNC: 9.5 MG/DL (ref 8.5–10.5)
CHLORIDE SERPL-SCNC: 104 MEQ/L (ref 98–111)
CO2 SERPL-SCNC: 25 MEQ/L (ref 23–33)
CREAT SERPL-MCNC: 0.8 MG/DL (ref 0.4–1.2)
DEPRECATED RDW RBC AUTO: 49.8 FL (ref 35–45)
EOSINOPHIL NFR BLD AUTO: 0.3 %
EOSINOPHILS ABSOLUTE: 0 THOU/MM3 (ref 0–0.4)
ERYTHROCYTE [DISTWIDTH] IN BLOOD BY AUTOMATED COUNT: 14.8 % (ref 11.5–14.5)
GFR SERPL CREATININE-BSD FRML MDRD: > 60 ML/MIN/1.73M2
GLUCOSE SERPL-MCNC: 104 MG/DL (ref 70–108)
HCT VFR BLD AUTO: 35.3 % (ref 37–47)
HGB BLD-MCNC: 11.7 GM/DL (ref 12–16)
IMM GRANULOCYTES # BLD AUTO: 0.03 THOU/MM3 (ref 0–0.07)
IMM GRANULOCYTES NFR BLD AUTO: 0.4 %
LYMPHOCYTES ABSOLUTE: 1.6 THOU/MM3 (ref 1–4.8)
LYMPHOCYTES NFR BLD AUTO: 20.7 %
MCH RBC QN AUTO: 30.7 PG (ref 26–33)
MCHC RBC AUTO-ENTMCNC: 33.1 GM/DL (ref 32.2–35.5)
MCV RBC AUTO: 92.7 FL (ref 81–99)
MONOCYTES ABSOLUTE: 0.8 THOU/MM3 (ref 0.4–1.3)
MONOCYTES NFR BLD AUTO: 10.4 %
NEUTROPHILS NFR BLD AUTO: 67.7 %
NRBC BLD AUTO-RTO: 0 /100 WBC
OSMOLALITY SERPL CALC.SUM OF ELEC: 285.7 MOSMOL/KG (ref 275–300)
PLATELET # BLD AUTO: 304 THOU/MM3 (ref 130–400)
PMV BLD AUTO: 9.6 FL (ref 9.4–12.4)
POTASSIUM SERPL-SCNC: 4 MEQ/L (ref 3.5–5.2)
PROT SERPL-MCNC: 7.2 G/DL (ref 6.1–8)
RBC # BLD AUTO: 3.81 MILL/MM3 (ref 4.2–5.4)
SEGMENTED NEUTROPHILS ABSOLUTE COUNT: 5.3 THOU/MM3 (ref 1.8–7.7)
SODIUM SERPL-SCNC: 142 MEQ/L (ref 135–145)
WBC # BLD AUTO: 7.8 THOU/MM3 (ref 4.8–10.8)

## 2023-11-09 PROCEDURE — 82248 BILIRUBIN DIRECT: CPT

## 2023-11-09 PROCEDURE — 99283 EMERGENCY DEPT VISIT LOW MDM: CPT

## 2023-11-09 PROCEDURE — 80053 COMPREHEN METABOLIC PANEL: CPT

## 2023-11-09 PROCEDURE — 36415 COLL VENOUS BLD VENIPUNCTURE: CPT

## 2023-11-09 PROCEDURE — 85025 COMPLETE CBC W/AUTO DIFF WBC: CPT

## 2023-11-09 RX ORDER — PREDNISONE 1 MG/1
30 TABLET ORAL 2 TIMES DAILY
COMMUNITY

## 2023-11-09 RX ORDER — DULOXETIN HYDROCHLORIDE 30 MG/1
30 CAPSULE, DELAYED RELEASE ORAL DAILY
COMMUNITY

## 2023-11-09 NOTE — ED TRIAGE NOTES
Pt comes to ED with c/o puritis in her hands and feet. PT states that this started two days ago after her second chemo treatment for breast cancer. Pt states the chemo is called Taxol. Pt states she was seen and given prednisone. Pt states she has taken over 50 benadryl in the last 4 days to try and try the itching. Pt states sometimes it helps and other times it does not.

## 2023-11-10 ASSESSMENT — ENCOUNTER SYMPTOMS
SHORTNESS OF BREATH: 0
NAUSEA: 0
DIARRHEA: 0
ABDOMINAL DISTENTION: 0
COUGH: 0

## 2023-11-10 NOTE — DISCHARGE INSTRUCTIONS
Be sure to increase your Cymbalta to 60 mg once a day. Continue your previously prescribed diphenhydramine and prednisone. Follow-up with oncology as scheduled on 11/13/2023.

## 2023-11-10 NOTE — ED PROVIDER NOTES
likely due to neuropathy. She is to continue her antihistamine therapy as well as increase Cymbalta to 60 mg daily. She is scheduled for follow-up with oncology on 1113. They will reassess patient's symptoms at that time. Reviewed strict return precautions with patient she is agreeable to plan of care. The diagnosis, extensive differential diagnosis, plan of care, laboratory, and imaging findings were discussed at the bedside. All questions and concerns were addressed at the time of the encounter. MEDICATION CHANGES     DISCHARGE MEDICATIONS:  Current Discharge Medication List               FINAL DISPOSITION     Final diagnoses:   Chemotherapy adverse reaction, subsequent encounter     Condition: condition: good  Dispo: Discharge to home    PATIENT REFERRED TO:  Chele Singh MD  [facility]  [phone]    Schedule an appointment as soon as possible for a visit in 1 week      [address]  [phone]    Go on 11/13/2023        Critical Care Time   CRITICAL CARE:  None    PROCEDURES: (None if blank)  Procedures:   Medical Decision Making      This transcription was electronically signed. Parts of this transcriptions may have been dictated by use of voice recognition software and electronically transcribed, and parts may have been transcribed with the assistance of an ED scribe. The transcription may contain errors not detected in proofreading.     Electronically Signed: Girish Rosario DO, 11/09/23, 8:26 PM         Girish Rosario DO  11/10/23 6404

## 2023-12-02 ENCOUNTER — APPOINTMENT (OUTPATIENT)
Dept: GENERAL RADIOLOGY | Age: 48
End: 2023-12-02
Payer: COMMERCIAL

## 2023-12-02 ENCOUNTER — HOSPITAL ENCOUNTER (EMERGENCY)
Age: 48
Discharge: HOME | End: 2023-12-02
Attending: STUDENT IN AN ORGANIZED HEALTH CARE EDUCATION/TRAINING PROGRAM
Payer: COMMERCIAL

## 2023-12-02 VITALS
HEIGHT: 67 IN | TEMPERATURE: 98.5 F | OXYGEN SATURATION: 97 % | HEART RATE: 78 BPM | WEIGHT: 195 LBS | DIASTOLIC BLOOD PRESSURE: 75 MMHG | SYSTOLIC BLOOD PRESSURE: 138 MMHG | BODY MASS INDEX: 30.61 KG/M2 | RESPIRATION RATE: 14 BRPM

## 2023-12-02 DIAGNOSIS — R07.9 CHEST PAIN, UNSPECIFIED TYPE: Primary | ICD-10-CM

## 2023-12-02 LAB
ALBUMIN SERPL BCG-MCNC: 3.7 G/DL (ref 3.5–5.1)
ALP SERPL-CCNC: 61 U/L (ref 38–126)
ALT SERPL W/O P-5'-P-CCNC: 34 U/L (ref 11–66)
ANION GAP SERPL CALC-SCNC: 10 MEQ/L (ref 8–16)
AST SERPL-CCNC: 20 U/L (ref 5–40)
BASOPHILS ABSOLUTE: 0 THOU/MM3 (ref 0–0.1)
BASOPHILS NFR BLD AUTO: 1 %
BILIRUB CONJ SERPL-MCNC: < 0.2 MG/DL (ref 0–0.3)
BILIRUB SERPL-MCNC: 0.5 MG/DL (ref 0.3–1.2)
BUN SERPL-MCNC: 14 MG/DL (ref 7–22)
CALCIUM SERPL-MCNC: 9.3 MG/DL (ref 8.5–10.5)
CHLORIDE SERPL-SCNC: 104 MEQ/L (ref 98–111)
CO2 SERPL-SCNC: 26 MEQ/L (ref 23–33)
CREAT SERPL-MCNC: 0.7 MG/DL (ref 0.4–1.2)
D DIMER PPP IA.FEU-MCNC: 338 NG/ML FEU (ref 0–500)
DEPRECATED RDW RBC AUTO: 45.1 FL (ref 35–45)
EKG ATRIAL RATE: 70 BPM
EKG P AXIS: 45 DEGREES
EKG P-R INTERVAL: 126 MS
EKG Q-T INTERVAL: 418 MS
EKG QRS DURATION: 80 MS
EKG QTC CALCULATION (BAZETT): 451 MS
EKG R AXIS: 55 DEGREES
EKG T AXIS: 63 DEGREES
EKG VENTRICULAR RATE: 70 BPM
EOSINOPHIL NFR BLD AUTO: 3.8 %
EOSINOPHILS ABSOLUTE: 0.1 THOU/MM3 (ref 0–0.4)
ERYTHROCYTE [DISTWIDTH] IN BLOOD BY AUTOMATED COUNT: 13.8 % (ref 11.5–14.5)
FLUAV RNA RESP QL NAA+PROBE: NOT DETECTED
FLUBV RNA RESP QL NAA+PROBE: NOT DETECTED
GFR SERPL CREATININE-BSD FRML MDRD: > 60 ML/MIN/1.73M2
GLUCOSE SERPL-MCNC: 106 MG/DL (ref 70–108)
HCT VFR BLD AUTO: 33 % (ref 37–47)
HGB BLD-MCNC: 11 GM/DL (ref 12–16)
IMM GRANULOCYTES # BLD AUTO: 0 THOU/MM3 (ref 0–0.07)
IMM GRANULOCYTES NFR BLD AUTO: 0 %
LIPASE SERPL-CCNC: 16.9 U/L (ref 5.6–51.3)
LYMPHOCYTES ABSOLUTE: 0.9 THOU/MM3 (ref 1–4.8)
LYMPHOCYTES NFR BLD AUTO: 30.7 %
MAGNESIUM SERPL-MCNC: 1.8 MG/DL (ref 1.6–2.4)
MCH RBC QN AUTO: 30 PG (ref 26–33)
MCHC RBC AUTO-ENTMCNC: 33.3 GM/DL (ref 32.2–35.5)
MCV RBC AUTO: 89.9 FL (ref 81–99)
MONOCYTES ABSOLUTE: 0.1 THOU/MM3 (ref 0.4–1.3)
MONOCYTES NFR BLD AUTO: 4.1 %
NEUTROPHILS NFR BLD AUTO: 60.4 %
NRBC BLD AUTO-RTO: 0 /100 WBC
OSMOLALITY SERPL CALC.SUM OF ELEC: 280.3 MOSMOL/KG (ref 275–300)
PLATELET # BLD AUTO: 237 THOU/MM3 (ref 130–400)
PMV BLD AUTO: 9.8 FL (ref 9.4–12.4)
POTASSIUM SERPL-SCNC: 3.8 MEQ/L (ref 3.5–5.2)
PROT SERPL-MCNC: 6.5 G/DL (ref 6.1–8)
RBC # BLD AUTO: 3.67 MILL/MM3 (ref 4.2–5.4)
SARS-COV-2 RNA RESP QL NAA+PROBE: NOT DETECTED
SCAN OF BLOOD SMEAR: NORMAL
SEGMENTED NEUTROPHILS ABSOLUTE COUNT: 1.8 THOU/MM3 (ref 1.8–7.7)
SMUDGE CELLS BLD QL SMEAR: PRESENT
SODIUM SERPL-SCNC: 140 MEQ/L (ref 135–145)
TROPONIN, HIGH SENSITIVITY: 25 NG/L (ref 0–12)
TROPONIN, HIGH SENSITIVITY: 29 NG/L (ref 0–12)
VARIANT LYMPHS BLD QL SMEAR: ABNORMAL %
WBC # BLD AUTO: 2.9 THOU/MM3 (ref 4.8–10.8)

## 2023-12-02 PROCEDURE — 82248 BILIRUBIN DIRECT: CPT

## 2023-12-02 PROCEDURE — 80053 COMPREHEN METABOLIC PANEL: CPT

## 2023-12-02 PROCEDURE — 85379 FIBRIN DEGRADATION QUANT: CPT

## 2023-12-02 PROCEDURE — 83690 ASSAY OF LIPASE: CPT

## 2023-12-02 PROCEDURE — 36415 COLL VENOUS BLD VENIPUNCTURE: CPT

## 2023-12-02 PROCEDURE — 93005 ELECTROCARDIOGRAM TRACING: CPT

## 2023-12-02 PROCEDURE — 71045 X-RAY EXAM CHEST 1 VIEW: CPT

## 2023-12-02 PROCEDURE — 87636 SARSCOV2 & INF A&B AMP PRB: CPT

## 2023-12-02 PROCEDURE — 83735 ASSAY OF MAGNESIUM: CPT

## 2023-12-02 PROCEDURE — 85025 COMPLETE CBC W/AUTO DIFF WBC: CPT

## 2023-12-02 PROCEDURE — 93010 ELECTROCARDIOGRAM REPORT: CPT | Performed by: INTERNAL MEDICINE

## 2023-12-02 PROCEDURE — 99285 EMERGENCY DEPT VISIT HI MDM: CPT

## 2023-12-02 PROCEDURE — 84484 ASSAY OF TROPONIN QUANT: CPT

## 2023-12-02 PROCEDURE — 6360000002 HC RX W HCPCS

## 2023-12-02 RX ORDER — HEPARIN 100 UNIT/ML
300 SYRINGE INTRAVENOUS ONCE
Status: COMPLETED | OUTPATIENT
Start: 2023-12-02 | End: 2023-12-02

## 2023-12-02 RX ADMIN — HEPARIN 300 UNITS: 100 SYRINGE at 07:50

## 2023-12-02 ASSESSMENT — PAIN - FUNCTIONAL ASSESSMENT
PAIN_FUNCTIONAL_ASSESSMENT: 0-10
PAIN_FUNCTIONAL_ASSESSMENT: NONE - DENIES PAIN

## 2023-12-02 ASSESSMENT — PAIN DESCRIPTION - LOCATION: LOCATION: CHEST

## 2023-12-02 ASSESSMENT — PAIN SCALES - GENERAL: PAINLEVEL_OUTOF10: 7

## 2023-12-02 ASSESSMENT — PAIN DESCRIPTION - ORIENTATION: ORIENTATION: MID

## 2023-12-02 NOTE — ED NOTES
PT resting in bed. PT denies any pain at this time. Respirations equal and unlabored. Call light in reach. Lights dimmed for comfort.      [place], [place]  12/02/23 0784

## 2023-12-02 NOTE — DISCHARGE INSTRUCTIONS
You were seen and evaluated in the emergency department today for chest pain that self resolved after 30 minutes. Your troponin was initially elevated and remained stable. We believe that you are safe to follow-up with cardiology as an outpatient. You are scheduled to follow-up with them on Monday at 10 AM please go to this appointment. If you have significant worsening of chest pain prior to that time come to nearest emergency department for reevaluation.

## 2023-12-02 NOTE — ED PROVIDER NOTES
[facility] DEPT  EMERGENCY DEPARTMENT ENCOUNTER          Pt Name: Evelyn Mckeon  MRN: [id number]  [address] [facility] 1975  Date of evaluation: 12/2/2023  Physician: Giovana Toscano MD  Supervising Attending Physician: Rick Cook DO       CHIEF COMPLAINT       Chief Complaint   Patient presents with    Chest Pain         HISTORY OF PRESENT ILLNESS    HPI  Evelyn Mckeon is a 46 y.o. female who presents to the emergency department from home, as a walk in to the ED lobby for evaluation of chest pain    Patient reports history of stage II breast cancer on neoadjuvant chemotherapy, no past cardiac history, hypertension, or hyperlipidemia. Presents complaining of chest pains described as heaviness that her up from sleep roughly 30 to 35 minutes prior to presentation she denies any radiation she denies any associated sweating or nausea the pain is located across the chest.  The pain is associated with shortness of breath, patient is not able to identify if she has orthopnea or not. She also endorses cough that has been present for the past several weeks and is nonproductive. She was on Taxol infusions and is planned for surgery to resect her tumor. The patient has no other acute complaints at this time. REVIEW OF SYSTEMS   Review of Systems      PAST MEDICAL AND SURGICAL HISTORY     Past Medical History:   Diagnosis Date    Anxiety     Cancer ([address])     Migraine      Past Surgical History:   Procedure Laterality Date    TONSILLECTOMY      TUBAL LIGATION           MEDICATIONS   No current facility-administered medications for this encounter.     Current Outpatient Medications:     predniSONE (DELTASONE) 1 MG tablet, Take 30 tablets by mouth 2 times daily, Disp: , Rfl:     DULoxetine (CYMBALTA) 30 MG extended release capsule, Take 1 capsule by mouth daily, Disp: , Rfl:     Previous Medications    DULOXETINE (CYMBALTA) 30 MG EXTENDED RELEASE CAPSULE    Take 1 capsule by mouth daily of health impacting treatment or disposition:  Considered and not applicable       PREVIOUS RECORDS  AND EXTERNAL INFORMATION REVIEWED   History obtained from: the patient. Pertinent previous and/or external records reviewed: Noncontributory. Case discussed with specialties other than Emergency Medicine: Not Applicable      ED COURSE   ED Medications administered this visit (None if left blank):   Medications - No data to display             PROCEDURES: (None if blank)  Procedures:       MEDICATION CHANGES     New Prescriptions    No medications on file         FINAL DISPOSITION   Medical Decision Making    28-year-old female cancer patient on neoadjuvant chemotherapy presents complaining of chest pain. EKG within normal limits workup still pending we will sign out to day team for further evaluation and disposition. CRITICAL CARE:  None    Shared Decision-Making was performed, disposition discussed with the patient/family and questions answered. Outpatient follow up (If applicable):  No follow-up provider specified. Not applicable          FINAL DIAGNOSES:  Final diagnoses:   Chest pain, unspecified type       Condition: condition: good  Dispo: Signed out to day team  DISPOSITION        This transcription was electronically signed. It was dictated by use of voice recognition software and electronically transcribed. The transcription may contain errors not detected in proofreading.        Annabel Gan MD  Resident  12/02/23 8540

## 2023-12-02 NOTE — ED NOTES
PT resting in bed. PT and VS assessed. Call light in reach. Respirations equal and unlabored.       Deven Teixeira RN  12/02/23 4018

## 2023-12-02 NOTE — ED PROVIDER NOTES
Transfer of Care Note:   Physician Signing out: Brian Adam  Receiving Physician: Rodrigo Solomon MD  Sign out time: 6AM      Brief history:  Patient 49-year-old female presented to ED for chest pain. Patient has history of stage II breast cancer on neoadjuvant chemotherapy, no past cardiac history, hypertension or hyperlipidemia. Presenting complaint chest pain described as heaviness woke her from sleep approximately 3035 minutes prior to presentation. Denies any radiation, associated sweating, nausea, pain is located across chest.  Associated shortness of breath. Endorses cough present for past several weeks, nonproductive. Patient was on Taxol infusions plan for surgery resection of her tumor. Items pending that need to be checked:  Repeat troponin      Tentative Impression of patient:  Stable    Expected disposition of patient:  Pending results, discharged. Additional Assessment and results:   I have personally performed a face to face diagnostic evaluation on this patient. The patient's initial evaluation and plan have been discussed with the prior physician who initially evaluated the patient. Nursing Notes, Past Medical Hx, Past Surgical Hx, Social Hx, Allergies, vital signs and Family Hx were all reviewed. Vitals:    12/02/23 0630   BP: 138/75   Pulse: 78   Resp: 14   Temp:    SpO2: 97%     Physical Exam  Constitutional:       General: She is not in acute distress. Appearance: She is obese. She is not toxic-appearing. HENT:      Head: Normocephalic. Nose: Nose normal.      Mouth/Throat:      Mouth: Mucous membranes are moist.      Pharynx: Oropharynx is clear. Eyes:      Extraocular Movements: Extraocular movements intact. Pupils: Pupils are equal, round, and reactive to light. Cardiovascular:      Rate and Rhythm: Normal rate and regular rhythm. Pulses: Normal pulses. Heart sounds: Normal heart sounds.    Pulmonary:      Effort: Pulmonary effort is normal. No respiratory distress. Breath sounds: No wheezing. Chest:      Chest wall: No tenderness. Abdominal:      General: Abdomen is flat. Palpations: Abdomen is soft. Musculoskeletal:         General: No swelling or tenderness. Right lower leg: No edema. Left lower leg: No edema. Skin:     General: Skin is warm and dry. Capillary Refill: Capillary refill takes less than 2 seconds. Neurological:      Mental Status: She is alert and oriented to person, place, and time. Psychiatric:         Mood and Affect: Mood normal.         Behavior: Behavior normal.           Labs Reviewed   CBC WITH AUTO DIFFERENTIAL - Abnormal; Notable for the following components:       Result Value    WBC 2.9 (*)     RBC 3.67 (*)     Hemoglobin 11.0 (*)     Hematocrit 33.0 (*)     RDW-SD 45.1 (*)     All other components within normal limits   TROPONIN - Abnormal; Notable for the following components:    Troponin, High Sensitivity 29 (*)     All other components within normal limits   TROPONIN - Abnormal; Notable for the following components:    Troponin, High Sensitivity 25 (*)     All other components within normal limits   COVID-19 & INFLUENZA COMBO   BASIC METABOLIC PANEL   HEPATIC FUNCTION PANEL   LIPASE   MAGNESIUM   D-DIMER, QUANTITATIVE   ANION GAP   GLOMERULAR FILTRATION RATE, ESTIMATED   OSMOLALITY         Medications - No data to display      XR CHEST PORTABLE   Final Result      No acute disease. This document has been electronically signed by: Radha Quintana MD on    12/02/2023 05:35 AM                   Further MDM and disposition:   Assessment:   Self resolving chest pain. Plan:   Discharge home follow-up with cardiology clinic Monday scheduled through [facility] for 10 AM.  Patient given strict return precautions to ED if she has worsening of symptoms prior to appointment. .    Final diagnoses:   Chest pain, unspecified type     Current Discharge Medication List            Condition: condition:

## 2023-12-04 ENCOUNTER — OFFICE VISIT (OUTPATIENT)
Dept: CARDIOLOGY CLINIC | Age: 48
End: 2023-12-04
Payer: COMMERCIAL

## 2023-12-04 VITALS
HEIGHT: 68 IN | WEIGHT: 196 LBS | BODY MASS INDEX: 29.7 KG/M2 | DIASTOLIC BLOOD PRESSURE: 68 MMHG | SYSTOLIC BLOOD PRESSURE: 134 MMHG | HEART RATE: 80 BPM

## 2023-12-04 DIAGNOSIS — R07.2 PRECORDIAL PAIN: Primary | ICD-10-CM

## 2023-12-04 PROCEDURE — 99204 OFFICE O/P NEW MOD 45 MIN: CPT | Performed by: NUCLEAR MEDICINE

## 2023-12-04 RX ORDER — PANTOPRAZOLE SODIUM 40 MG/1
40 TABLET, DELAYED RELEASE ORAL DAILY
COMMUNITY
End: 2023-12-04 | Stop reason: SDUPTHER

## 2023-12-04 RX ORDER — PANTOPRAZOLE SODIUM 40 MG/1
40 TABLET, DELAYED RELEASE ORAL DAILY
Qty: 30 TABLET | Refills: 5 | Status: SHIPPED | OUTPATIENT
Start: 2023-12-04

## 2023-12-04 ASSESSMENT — ENCOUNTER SYMPTOMS
PHOTOPHOBIA: 0
VOMITING: 0
ABDOMINAL DISTENTION: 0
ABDOMINAL PAIN: 0
RECTAL PAIN: 0
BLOOD IN STOOL: 0
ANAL BLEEDING: 0
COLOR CHANGE: 0
DIARRHEA: 0
NAUSEA: 0
CONSTIPATION: 0
SHORTNESS OF BREATH: 1
CHEST TIGHTNESS: 1
BACK PAIN: 0

## 2023-12-04 NOTE — PROGRESS NOTES
[address] [facility]  [address] [place] [address]  Dept: [phone]  Dept Fax: [phone]  Loc: [phone]    Visit Date: 12/4/2023    Mir Coe is a 46 y.o. female who presents todayfor:  Chief Complaint   Patient presents with    New Patient    Establish Cardiologist    Chest Pain   Here from the ER   Chest pain   Diagnosed with breast cancer   Stage II  On chemo and pending surgery   Had chest pain lately   No triggers  Was at night   Woke up with it   Had it few times so far  Lasts several minutes  Feels like heaviness  No radiation per se  Not exertional   ER work up was okay   No known CAD  No smoking   Family history of CAD         HPI:  Chest Pain   Associated symptoms include shortness of breath. Pertinent negatives include no abdominal pain, back pain, dizziness, nausea or vomiting. Past Medical History:   Diagnosis Date    Anxiety     Cancer ([address])     Migraine       Past Surgical History:   Procedure Laterality Date    TONSILLECTOMY      TUBAL LIGATION       No family history on file. Social History     Tobacco Use    Smoking status: Never    Smokeless tobacco: Never   Substance Use Topics    Alcohol use: Yes     Comment: socially      Current Outpatient Medications   Medication Sig Dispense Refill    DULoxetine (CYMBALTA) 30 MG extended release capsule Take 1 capsule by mouth daily       No current facility-administered medications for this visit.      No Known Allergies  Health Maintenance   Topic Date Due    Hepatitis B vaccine (1 of 3 - 3-dose series) Never done    COVID-19 Vaccine (1) Never done    Depression Screen  Never done    HIV screen  Never done    Hepatitis C screen  Never done    DTaP/Tdap/Td vaccine (1 - Tdap) Never done    Cervical cancer screen  Never done    Diabetes screen  Never done    Lipids  Never done    Colorectal Cancer Screen  Never done    Shingles vaccine (1 of 2) Never done    Flu

## 2023-12-14 ENCOUNTER — HOSPITAL ENCOUNTER (OUTPATIENT)
Age: 48
Discharge: HOME | End: 2023-12-16
Attending: NUCLEAR MEDICINE
Payer: COMMERCIAL

## 2023-12-14 DIAGNOSIS — R07.2 PRECORDIAL PAIN: ICD-10-CM

## 2023-12-14 LAB
ECHO AV CUSP MM: 1.8 CM
ECHO AV PEAK GRADIENT: 10 MMHG
ECHO AV PEAK VELOCITY: 1.6 M/S
ECHO AV VELOCITY RATIO: 0.63
ECHO LA AREA 2C: 13.6 CM2
ECHO LA AREA 4C: 15 CM2
ECHO LA DIAMETER: 3.4 CM
ECHO LA MAJOR AXIS: 4.9 CM
ECHO LA MINOR AXIS: 4.7 CM
ECHO LA VOL BP: 34 ML (ref 22–52)
ECHO LA VOL MOD A2C: 32 ML (ref 22–52)
ECHO LA VOL MOD A4C: 35 ML (ref 22–52)
ECHO LV E' LATERAL VELOCITY: 7 CM/S
ECHO LV E' SEPTAL VELOCITY: 7 CM/S
ECHO LV FRACTIONAL SHORTENING: 37 % (ref 28–44)
ECHO LV INTERNAL DIMENSION DIASTOLIC: 4.9 CM (ref 3.9–5.3)
ECHO LV INTERNAL DIMENSION SYSTOLIC: 3.1 CM
ECHO LV IVSD: 0.6 CM (ref 0.6–0.9)
ECHO LV MASS 2D: 131.2 G (ref 67–162)
ECHO LV POSTERIOR WALL DIASTOLIC: 1 CM (ref 0.6–0.9)
ECHO LV RELATIVE WALL THICKNESS RATIO: 0.41
ECHO LVOT PEAK GRADIENT: 4 MMHG
ECHO LVOT PEAK VELOCITY: 1 M/S
ECHO MV A VELOCITY: 0.69 M/S
ECHO MV E DECELERATION TIME (DT): 222 MS
ECHO MV E VELOCITY: 0.55 M/S
ECHO MV E/A RATIO: 0.8
ECHO MV E/E' LATERAL: 7.86
ECHO MV E/E' RATIO (AVERAGED): 7.86
ECHO PULMONARY ARTERY END DIASTOLIC PRESSURE: 6 MMHG
ECHO PV MAX VELOCITY: 1 M/S
ECHO PV PEAK GRADIENT: 4 MMHG
ECHO PV REGURGITANT MAX VELOCITY: 1.2 M/S
ECHO RV INTERNAL DIMENSION: 2.6 CM
ECHO TV E WAVE: 0.4 M/S
ECHO TV REGURGITANT MAX VELOCITY: 2.24 M/S
ECHO TV REGURGITANT PEAK GRADIENT: 20 MMHG

## 2023-12-14 PROCEDURE — 93306 TTE W/DOPPLER COMPLETE: CPT | Performed by: NUCLEAR MEDICINE

## 2023-12-14 PROCEDURE — 93306 TTE W/DOPPLER COMPLETE: CPT

## 2024-01-08 ENCOUNTER — TELEPHONE (OUTPATIENT)
Dept: CARDIOLOGY CLINIC | Age: 49
End: 2024-01-08

## 2024-01-16 ENCOUNTER — HOSPITAL ENCOUNTER (OUTPATIENT)
Dept: WOMENS IMAGING | Age: 49
Discharge: HOME | End: 2024-01-16
Attending: RADIOLOGY

## 2024-01-16 ENCOUNTER — HOSPITAL ENCOUNTER (OUTPATIENT)
Dept: ULTRASOUND IMAGING | Age: 49
Discharge: HOME | End: 2024-01-16
Attending: RADIOLOGY

## 2024-01-16 ENCOUNTER — HOSPITAL ENCOUNTER (OUTPATIENT)
Dept: MRI IMAGING | Age: 49
Discharge: HOME | End: 2024-01-16
Attending: RADIOLOGY

## 2024-01-16 DIAGNOSIS — Z00.6 ENCOUNTER FOR EXAMINATION FOR NORMAL COMPARISON OR CONTROL IN CLINICAL RESEARCH PROGRAM: ICD-10-CM

## 2024-01-16 DIAGNOSIS — Z00.6 EXAMINATION FOR NORMAL COMPARISON FOR CLINICAL RESEARCH: ICD-10-CM

## 2024-01-18 ENCOUNTER — HOSPITAL ENCOUNTER (OUTPATIENT)
Dept: NUCLEAR MEDICINE | Age: 49
Discharge: HOME | End: 2024-01-18
Attending: RADIOLOGY

## 2024-01-18 ENCOUNTER — HOSPITAL ENCOUNTER (OUTPATIENT)
Dept: CT IMAGING | Age: 49
Discharge: HOME | End: 2024-01-18
Attending: RADIOLOGY

## 2024-01-18 DIAGNOSIS — Z00.6 EXAMINATION FOR NORMAL COMPARISON FOR CLINICAL RESEARCH: ICD-10-CM

## 2024-01-19 ENCOUNTER — APPOINTMENT (OUTPATIENT)
Dept: GENERAL RADIOLOGY | Age: 49
End: 2024-01-19
Payer: COMMERCIAL

## 2024-01-19 ENCOUNTER — HOSPITAL ENCOUNTER (EMERGENCY)
Age: 49
Discharge: HOME | End: 2024-01-19
Payer: COMMERCIAL

## 2024-01-19 VITALS
BODY MASS INDEX: 30.31 KG/M2 | HEIGHT: 68 IN | HEART RATE: 80 BPM | WEIGHT: 200 LBS | TEMPERATURE: 97 F | SYSTOLIC BLOOD PRESSURE: 136 MMHG | OXYGEN SATURATION: 97 % | DIASTOLIC BLOOD PRESSURE: 90 MMHG | RESPIRATION RATE: 16 BRPM

## 2024-01-19 DIAGNOSIS — S50.01XA CONTUSION OF RIGHT ELBOW, INITIAL ENCOUNTER: Primary | ICD-10-CM

## 2024-01-19 PROCEDURE — 99212 OFFICE O/P EST SF 10 MIN: CPT | Performed by: EMERGENCY MEDICINE

## 2024-01-19 PROCEDURE — 73080 X-RAY EXAM OF ELBOW: CPT

## 2024-01-19 PROCEDURE — 99213 OFFICE O/P EST LOW 20 MIN: CPT

## 2024-01-19 ASSESSMENT — PAIN SCALES - GENERAL: PAINLEVEL_OUTOF10: 8

## 2024-01-19 ASSESSMENT — PAIN DESCRIPTION - LOCATION: LOCATION: ELBOW

## 2024-01-19 ASSESSMENT — PAIN DESCRIPTION - ORIENTATION: ORIENTATION: RIGHT

## 2024-01-19 ASSESSMENT — PAIN - FUNCTIONAL ASSESSMENT: PAIN_FUNCTIONAL_ASSESSMENT: 0-10
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
## 2024-01-20 NOTE — DISCHARGE INSTRUCTIONS
Ice to the area frequently    Continue Tylenol as needed for pain    Use your Ultram as directed as needed for severe pain    Follow-up with orthopedics next week or your primary care provider if symptoms are not improving.  Sooner if worse
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
## 2024-01-21 ENCOUNTER — APPOINTMENT (OUTPATIENT)
Dept: CT IMAGING | Age: 49
End: 2024-01-21
Payer: COMMERCIAL

## 2024-01-21 ENCOUNTER — HOSPITAL ENCOUNTER (EMERGENCY)
Age: 49
Discharge: HOME | End: 2024-01-21
Payer: COMMERCIAL

## 2024-01-21 VITALS
SYSTOLIC BLOOD PRESSURE: 141 MMHG | TEMPERATURE: 98.5 F | OXYGEN SATURATION: 95 % | HEART RATE: 74 BPM | RESPIRATION RATE: 16 BRPM | BODY MASS INDEX: 30.41 KG/M2 | WEIGHT: 200 LBS | DIASTOLIC BLOOD PRESSURE: 96 MMHG

## 2024-01-21 DIAGNOSIS — R07.9 CHEST PAIN, UNSPECIFIED TYPE: Primary | ICD-10-CM

## 2024-01-21 LAB
ANION GAP SERPL CALC-SCNC: 11 MEQ/L (ref 8–16)
BASOPHILS ABSOLUTE: 0 THOU/MM3 (ref 0–0.1)
BASOPHILS NFR BLD AUTO: 0.8 %
BUN SERPL-MCNC: 16 MG/DL (ref 7–22)
CALCIUM SERPL-MCNC: 9.2 MG/DL (ref 8.5–10.5)
CHLORIDE SERPL-SCNC: 104 MEQ/L (ref 98–111)
CO2 SERPL-SCNC: 27 MEQ/L (ref 23–33)
CREAT SERPL-MCNC: 0.7 MG/DL (ref 0.4–1.2)
DEPRECATED RDW RBC AUTO: 45.6 FL (ref 35–45)
EKG ATRIAL RATE: 80 BPM
EKG P AXIS: 55 DEGREES
EKG P-R INTERVAL: 104 MS
EKG Q-T INTERVAL: 408 MS
EKG QRS DURATION: 82 MS
EKG QTC CALCULATION (BAZETT): 470 MS
EKG R AXIS: 50 DEGREES
EKG T AXIS: 59 DEGREES
EKG VENTRICULAR RATE: 80 BPM
EOSINOPHIL NFR BLD AUTO: 4.8 %
EOSINOPHILS ABSOLUTE: 0.2 THOU/MM3 (ref 0–0.4)
ERYTHROCYTE [DISTWIDTH] IN BLOOD BY AUTOMATED COUNT: 14.2 % (ref 11.5–14.5)
GFR SERPL CREATININE-BSD FRML MDRD: > 60 ML/MIN/1.73M2
GLUCOSE SERPL-MCNC: 101 MG/DL (ref 70–108)
HCT VFR BLD AUTO: 36.2 % (ref 37–47)
HGB BLD-MCNC: 11.8 GM/DL (ref 12–16)
IMM GRANULOCYTES # BLD AUTO: 0.02 THOU/MM3 (ref 0–0.07)
IMM GRANULOCYTES NFR BLD AUTO: 0.4 %
LYMPHOCYTES ABSOLUTE: 2 THOU/MM3 (ref 1–4.8)
LYMPHOCYTES NFR BLD AUTO: 39.2 %
MAGNESIUM SERPL-MCNC: 1.9 MG/DL (ref 1.6–2.4)
MCH RBC QN AUTO: 28.7 PG (ref 26–33)
MCHC RBC AUTO-ENTMCNC: 32.6 GM/DL (ref 32.2–35.5)
MCV RBC AUTO: 88.1 FL (ref 81–99)
MONOCYTES ABSOLUTE: 0.5 THOU/MM3 (ref 0.4–1.3)
MONOCYTES NFR BLD AUTO: 9.9 %
NEUTROPHILS NFR BLD AUTO: 44.9 %
NRBC BLD AUTO-RTO: 0 /100 WBC
OSMOLALITY SERPL CALC.SUM OF ELEC: 284.4 MOSMOL/KG (ref 275–300)
PLATELET # BLD AUTO: 281 THOU/MM3 (ref 130–400)
PMV BLD AUTO: 9.6 FL (ref 9.4–12.4)
POTASSIUM SERPL-SCNC: 3.7 MEQ/L (ref 3.5–5.2)
RBC # BLD AUTO: 4.11 MILL/MM3 (ref 4.2–5.4)
SEGMENTED NEUTROPHILS ABSOLUTE COUNT: 2.3 THOU/MM3 (ref 1.8–7.7)
SODIUM SERPL-SCNC: 142 MEQ/L (ref 135–145)
TROPONIN, HIGH SENSITIVITY: 10 NG/L (ref 0–12)
TROPONIN, HIGH SENSITIVITY: 10 NG/L (ref 0–12)
WBC # BLD AUTO: 5.2 THOU/MM3 (ref 4.8–10.8)

## 2024-01-21 PROCEDURE — 6360000004 HC RX CONTRAST MEDICATION: Performed by: PHYSICIAN ASSISTANT

## 2024-01-21 PROCEDURE — 36415 COLL VENOUS BLD VENIPUNCTURE: CPT

## 2024-01-21 PROCEDURE — 71275 CT ANGIOGRAPHY CHEST: CPT

## 2024-01-21 PROCEDURE — 96375 TX/PRO/DX INJ NEW DRUG ADDON: CPT

## 2024-01-21 PROCEDURE — 80048 BASIC METABOLIC PNL TOTAL CA: CPT

## 2024-01-21 PROCEDURE — 93010 ELECTROCARDIOGRAM REPORT: CPT | Performed by: NUCLEAR MEDICINE

## 2024-01-21 PROCEDURE — 85025 COMPLETE CBC W/AUTO DIFF WBC: CPT

## 2024-01-21 PROCEDURE — 83735 ASSAY OF MAGNESIUM: CPT

## 2024-01-21 PROCEDURE — 84484 ASSAY OF TROPONIN QUANT: CPT

## 2024-01-21 PROCEDURE — 99285 EMERGENCY DEPT VISIT HI MDM: CPT

## 2024-01-21 PROCEDURE — 96374 THER/PROPH/DIAG INJ IV PUSH: CPT

## 2024-01-21 PROCEDURE — 6360000002 HC RX W HCPCS: Performed by: PHYSICIAN ASSISTANT

## 2024-01-21 PROCEDURE — 93005 ELECTROCARDIOGRAM TRACING: CPT | Performed by: PHYSICIAN ASSISTANT

## 2024-01-21 RX ORDER — MORPHINE SULFATE 4 MG/ML
4 INJECTION, SOLUTION INTRAMUSCULAR; INTRAVENOUS ONCE
Status: COMPLETED | OUTPATIENT
Start: 2024-01-21 | End: 2024-01-21

## 2024-01-21 RX ORDER — HEPARIN 100 UNIT/ML
500 SYRINGE INTRAVENOUS ONCE
Status: COMPLETED | OUTPATIENT
Start: 2024-01-21 | End: 2024-01-21

## 2024-01-21 RX ORDER — ONDANSETRON 2 MG/ML
4 INJECTION INTRAMUSCULAR; INTRAVENOUS ONCE
Status: COMPLETED | OUTPATIENT
Start: 2024-01-21 | End: 2024-01-21

## 2024-01-21 RX ADMIN — IOPAMIDOL 80 ML: 755 INJECTION, SOLUTION INTRAVENOUS at 09:16

## 2024-01-21 RX ADMIN — HEPARIN 500 UNITS: 100 SYRINGE at 11:35

## 2024-01-21 RX ADMIN — ONDANSETRON 4 MG: 2 INJECTION INTRAMUSCULAR; INTRAVENOUS at 08:40

## 2024-01-21 RX ADMIN — MORPHINE SULFATE 4 MG: 4 INJECTION, SOLUTION INTRAMUSCULAR; INTRAVENOUS at 08:40

## 2024-01-21 ASSESSMENT — PAIN SCALES - GENERAL
PAINLEVEL_OUTOF10: 3
PAINLEVEL_OUTOF10: 0
PAINLEVEL_OUTOF10: 3

## 2024-01-21 ASSESSMENT — HEART SCORE: ECG: 1

## 2024-01-21 ASSESSMENT — PAIN - FUNCTIONAL ASSESSMENT
PAIN_FUNCTIONAL_ASSESSMENT: NONE - DENIES PAIN
PAIN_FUNCTIONAL_ASSESSMENT: 0-10
PAIN_FUNCTIONAL_ASSESSMENT: 0-10
PAIN_FUNCTIONAL_ASSESSMENT: NONE - DENIES PAIN

## 2024-01-21 ASSESSMENT — PAIN DESCRIPTION - LOCATION: LOCATION: CHEST

## 2024-01-21 ASSESSMENT — PAIN DESCRIPTION - PAIN TYPE: TYPE: ACUTE PAIN

## 2024-01-21 NOTE — ED NOTES
This RN in to assess VS. Pt offered box lunch at this time. Call light in reach. No new needs voiced at this time.

## 2024-01-21 NOTE — ED NOTES
Upon first contact with patient this RN receives bedside shift report from PING Peralta. Pt resting on cot in position of comfort. Pt is A&Ox4, resps easy and unlabored. Port accessed, shows no s/s of infection or infiltration. Pt reports headache at this time. Monitor in place. Updated pt on POC. Will monitor.

## 2024-01-21 NOTE — ED NOTES
ED nurse-to-nurse bedside report    Chief Complaint   Patient presents with    Chest Pain      LOC: alert and orientated to name, place, date  Vital signs   Vitals:    01/21/24 0810 01/21/24 0901 01/21/24 1020   BP: 135/84 121/88 (!) 134/98   Pulse: 79 74 70   Resp: 16 18 18   Temp: 98.5 °F (36.9 °C) 98.5 °F (36.9 °C)    TempSrc:  Oral    SpO2: 95% 100% 94%   Weight:  90.7 kg (200 lb)       Pain:    Pain Interventions: NA  Pain Goal: NA  Oxygen: No    Current needs required Room Air   Telemetry: Yes  LDAs:    Continuous Infusions:   Mobility: Independent  Rizzo Fall Risk Score:        No data to display              Fall Interventions: Side rails x2, call light within reach  Report given to: PING Veras and Yanely FENG

## 2024-01-21 NOTE — ED NOTES
ED nurse-to-nurse bedside report    Chief Complaint   Patient presents with    Chest Pain      LOC: alert and orientated to name, place, date  Vital signs   Vitals:    01/21/24 0810 01/21/24 0901   BP: 135/84 121/88   Pulse: 79 74   Resp: 16 18   Temp: 98.5 °F (36.9 °C)    SpO2: 95% 100%      Pain:    Pain Interventions: see MAR  Pain Goal: 2/10  Oxygen: No    Current needs required RA   Telemetry: Yes  LDAs:    Continuous Infusions:   Mobility: Independent/standby  Rizzo Fall Risk Score:        No data to display              Fall Interventions: call light in reach, bed in low position with wheels locked, side rials up x2  Report given to: PING Mcknight

## 2024-01-21 NOTE — ED NOTES
In for hourly rounding. Pt resting on cot in position of comfort. Pt remains A&Ox4, resps easy and unlabored. Port shows no s/s of infection or infiltration. Pt currently denies any pain, reports headache and chest pain have both subsided at this time. Monitor remains in place. Updated pt on POC. Will monitor.

## 2024-01-21 NOTE — ED TRIAGE NOTES
Pt to ED with chest pain. Pt states that she woke up about 0700 this AM with the feeling that something was sitting on her chest. Pain was a 10/10 when the ems picked her up. EMS gave the pt 4 ASA, one nitro. Pt states the chest pain is now a 3/10, but she has a headache. EKG done.

## 2024-01-21 NOTE — ED PROVIDER NOTES
socially    Drug use: No       PHYSICAL EXAM       ED Triage Vitals [01/21/24 0810]   BP Temp Temp src Pulse Respirations SpO2 Height Weight   135/84 98.5 °F (36.9 °C) -- 79 16 95 % -- --       Additional Vital Signs:  Vitals:    01/21/24 1112   BP: (!) 141/96   Pulse: 74   Resp: 16   Temp:    SpO2: 95%     Physical Exam  Constitutional:       Appearance: Normal appearance.      Interventions: She is not intubated.  Cardiovascular:      Rate and Rhythm: Normal rate and regular rhythm.      Pulses: Normal pulses.      Heart sounds: Normal heart sounds. No murmur heard.     No friction rub. No gallop.   Pulmonary:      Effort: Pulmonary effort is normal. No tachypnea, bradypnea, accessory muscle usage, prolonged expiration, respiratory distress or retractions. She is not intubated.      Breath sounds: Normal breath sounds. No stridor. No wheezing, rhonchi or rales.      Comments: Patient reports shortness of  Chest:      Chest wall: No tenderness.   Musculoskeletal:         General: No swelling, tenderness, deformity or signs of injury. Normal range of motion.      Cervical back: Normal range of motion.      Right lower leg: No edema.      Left lower leg: No edema.   Neurological:      General: No focal deficit present.      Mental Status: She is alert and oriented to person, place, and time. Mental status is at baseline.   Psychiatric:         Mood and Affect: Mood normal.         Behavior: Behavior normal.         Thought Content: Thought content normal.         Judgment: Judgment normal.         FORMAL DIAGNOSTIC RESULTS     RADIOLOGY: Interpretation per the Radiologist below, if available at the time of this note (none if blank):    CTA CHEST W WO CONTRAST   Final Result       1. No pulmonary emboli or pulmonary infiltrates.   2. Lumpectomy cavity in the upper left breast. There is an air-fluid level within this. Clinical correlation is recommended with any signs of infection.               **This report has been

## 2024-01-23 NOTE — PROGRESS NOTES
this visit.    General:   Well developed, well nourished  Lungs:   Clear to auscultation, no rales  Heart:    RRR, Normal S1 S2, No murmur, rubs, or gallops  Abdomen:   Soft, non tender, no organomegalies, positive bowel sounds  Extremities:   No edema, no cyanosis, good peripheral pulses  Neurological:   Awake, alert, oriented. No obvious focal deficits  Musculoskeletal:  No obvious deformities    EKG:          Diagnosis Orders   1. Chest pain in adult            No orders of the defined types were placed in this encounter.      Assessment/Plan:   Chest pain/sob--her symptoms could be anginal equivalent, woke her out of sleep. ACS was r/o. Protonix has not helped in the last 6 weeks. Asa and nitro did not seem to improve her pain. Will check angie as she does not feel well enough for treadmill given recent surgery and fatigue. If stress test is normal, possible her symptoms could be surgical related type. She does have family history of CABG per patietn.     Disposition:   Angie.   Follow up with Dr Barney as scheduled or sooner if needed

## 2024-01-25 ENCOUNTER — OFFICE VISIT (OUTPATIENT)
Dept: CARDIOLOGY CLINIC | Age: 49
End: 2024-01-25
Payer: COMMERCIAL

## 2024-01-25 VITALS
DIASTOLIC BLOOD PRESSURE: 79 MMHG | BODY MASS INDEX: 30.13 KG/M2 | HEIGHT: 68 IN | SYSTOLIC BLOOD PRESSURE: 128 MMHG | HEART RATE: 79 BPM | WEIGHT: 198.8 LBS

## 2024-01-25 DIAGNOSIS — R07.9 CHEST PAIN IN ADULT: Primary | ICD-10-CM

## 2024-01-25 DIAGNOSIS — R06.02 SHORTNESS OF BREATH: ICD-10-CM

## 2024-01-25 PROCEDURE — 99214 OFFICE O/P EST MOD 30 MIN: CPT | Performed by: STUDENT IN AN ORGANIZED HEALTH CARE EDUCATION/TRAINING PROGRAM

## 2024-01-25 RX ORDER — TRAMADOL HYDROCHLORIDE 50 MG/1
50 TABLET ORAL EVERY 6 HOURS PRN
COMMUNITY
Start: 2024-01-16

## 2024-01-25 RX ORDER — GABAPENTIN 100 MG/1
100 CAPSULE ORAL 2 TIMES DAILY
COMMUNITY

## 2024-02-01 PROBLEM — C77.3 BREAST CANCER METASTASIZED TO AXILLARY LYMPH NODE, LEFT (HCC): Status: ACTIVE | Noted: 2024-02-01

## 2024-02-01 PROBLEM — C50.912 BREAST CANCER METASTASIZED TO AXILLARY LYMPH NODE, LEFT (HCC): Status: ACTIVE | Noted: 2024-02-01

## 2024-02-02 ENCOUNTER — HOSPITAL ENCOUNTER (OUTPATIENT)
Dept: NUCLEAR MEDICINE | Age: 49
Discharge: HOME | End: 2024-02-02
Payer: COMMERCIAL

## 2024-02-02 ENCOUNTER — HOSPITAL ENCOUNTER (OUTPATIENT)
Age: 49
End: 2024-02-02
Payer: COMMERCIAL

## 2024-02-02 DIAGNOSIS — R07.9 CHEST PAIN IN ADULT: ICD-10-CM

## 2024-02-02 DIAGNOSIS — R06.02 SHORTNESS OF BREATH: ICD-10-CM

## 2024-02-02 LAB
NUC STRESS EJECTION FRACTION: 49 %
STRESS BASELINE DIAS BP: 93 MMHG
STRESS BASELINE HR: 66 BPM
STRESS BASELINE SYS BP: 171 MMHG
STRESS STAGE 1 BP: NORMAL MMHG
STRESS STAGE 1 DURATION: 1 MIN:SEC
STRESS STAGE 1 HR: 92 BPM
STRESS STAGE 2 BP: NORMAL MMHG
STRESS STAGE 2 DURATION: 1 MIN:SEC
STRESS STAGE 2 HR: 90 BPM
STRESS STAGE 3 BP: NORMAL MMHG
STRESS STAGE 3 DURATION: 1 MIN:SEC
STRESS STAGE 3 HR: 89 BPM
STRESS STAGE RECOVERY 1 BP: NORMAL MMHG
STRESS STAGE RECOVERY 1 DURATION: 1 MIN:SEC
STRESS STAGE RECOVERY 1 HR: 87 BPM
STRESS STAGE RECOVERY 2 BP: NORMAL MMHG
STRESS STAGE RECOVERY 2 DURATION: 1 MIN:SEC
STRESS STAGE RECOVERY 2 HR: 81 BPM
STRESS STAGE RECOVERY 3 BP: NORMAL MMHG
STRESS STAGE RECOVERY 3 DURATION: 1 MIN:SEC
STRESS STAGE RECOVERY 3 HR: 83 BPM
STRESS STAGE RECOVERY 4 BP: NORMAL MMHG
STRESS STAGE RECOVERY 4 DURATION: 2 MIN:SEC
STRESS STAGE RECOVERY 4 HR: 80 BPM
STRESS TARGET HR: 170 BPM

## 2024-02-02 PROCEDURE — 93017 CV STRESS TEST TRACING ONLY: CPT

## 2024-02-02 PROCEDURE — 6360000002 HC RX W HCPCS: Performed by: NUCLEAR MEDICINE

## 2024-02-02 PROCEDURE — A9500 TC99M SESTAMIBI: HCPCS | Performed by: STUDENT IN AN ORGANIZED HEALTH CARE EDUCATION/TRAINING PROGRAM

## 2024-02-02 PROCEDURE — 78452 HT MUSCLE IMAGE SPECT MULT: CPT

## 2024-02-02 PROCEDURE — 3430000000 HC RX DIAGNOSTIC RADIOPHARMACEUTICAL: Performed by: STUDENT IN AN ORGANIZED HEALTH CARE EDUCATION/TRAINING PROGRAM

## 2024-02-02 RX ORDER — TETRAKIS(2-METHOXYISOBUTYLISOCYANIDE)COPPER(I) TETRAFLUOROBORATE 1 MG/ML
10.2 INJECTION, POWDER, LYOPHILIZED, FOR SOLUTION INTRAVENOUS
Status: COMPLETED | OUTPATIENT
Start: 2024-02-02 | End: 2024-02-02

## 2024-02-02 RX ORDER — REGADENOSON 0.08 MG/ML
0.4 INJECTION, SOLUTION INTRAVENOUS
Status: COMPLETED | OUTPATIENT
Start: 2024-02-02 | End: 2024-02-02

## 2024-02-02 RX ORDER — TETRAKIS(2-METHOXYISOBUTYLISOCYANIDE)COPPER(I) TETRAFLUOROBORATE 1 MG/ML
33 INJECTION, POWDER, LYOPHILIZED, FOR SOLUTION INTRAVENOUS
Status: COMPLETED | OUTPATIENT
Start: 2024-02-02 | End: 2024-02-02

## 2024-02-02 RX ADMIN — REGADENOSON 0.4 MG: 0.08 INJECTION, SOLUTION INTRAVENOUS at 10:55

## 2024-02-02 RX ADMIN — Medication 33 MILLICURIE: at 10:56

## 2024-02-02 RX ADMIN — Medication 10.2 MILLICURIE: at 10:04

## 2024-02-05 ENCOUNTER — TELEPHONE (OUTPATIENT)
Dept: CARDIOLOGY CLINIC | Age: 49
End: 2024-02-05

## 2024-02-05 ENCOUNTER — HOSPITAL ENCOUNTER (OUTPATIENT)
Dept: RADIATION ONCOLOGY | Age: 49
Discharge: HOME | End: 2024-02-05
Payer: COMMERCIAL

## 2024-02-05 ENCOUNTER — SOCIAL WORK (OUTPATIENT)
Dept: INFUSION THERAPY | Age: 49
End: 2024-02-05

## 2024-02-05 VITALS
SYSTOLIC BLOOD PRESSURE: 143 MMHG | HEIGHT: 68 IN | WEIGHT: 203.8 LBS | BODY MASS INDEX: 30.89 KG/M2 | DIASTOLIC BLOOD PRESSURE: 86 MMHG | TEMPERATURE: 97.6 F | HEART RATE: 76 BPM | OXYGEN SATURATION: 93 % | RESPIRATION RATE: 16 BRPM

## 2024-02-05 DIAGNOSIS — C77.3 BREAST CANCER METASTASIZED TO AXILLARY LYMPH NODE, LEFT (HCC): Primary | ICD-10-CM

## 2024-02-05 DIAGNOSIS — C50.912 BREAST CANCER METASTASIZED TO AXILLARY LYMPH NODE, LEFT (HCC): Primary | ICD-10-CM

## 2024-02-05 PROCEDURE — 99205 OFFICE O/P NEW HI 60 MIN: CPT

## 2024-02-05 PROCEDURE — 99202 OFFICE O/P NEW SF 15 MIN: CPT | Performed by: RADIOLOGY

## 2024-02-05 ASSESSMENT — ENCOUNTER SYMPTOMS
SHORTNESS OF BREATH: 1
TROUBLE SWALLOWING: 0
COUGH: 0
RECTAL PAIN: 0
NAUSEA: 0
CONSTIPATION: 0
ABDOMINAL PAIN: 0
DIARRHEA: 0
VOMITING: 0
BLOOD IN STOOL: 0
BACK PAIN: 0

## 2024-02-05 NOTE — PROGRESS NOTES
- Tess was referred to this staff from her Radiation Nurse.   - Tess has been receiving her chemo treatments from [facility] and has elected to obtain her radiation treatments from the [facility]. She was referred to this staff to assist with obtaining an extension to her Short-term Disability through her employer.  - After reviewing her case and treatment plan, this staff was made aware she will not receive her simulation until after her existing short term disability expires. (2/16/24) Her need for disability is also related to side effects of her chemo treatment provided through [facility].   - This staff spoke with Tess and explained the situation and she voices understanding that Dr Viera can't address her inability to work since he has yet to begin treatment with her. She was advised to readdress with [facility].  - Tess was also sent information regarding the application for long-term disability. She was provided with educational information and offered assistance from this staff provided she include my as an alternate contact. All information was emailed her to the email address provided ([email or web address]).  - This staff provided her with my contact information and will remain available for support as needed.

## 2024-02-05 NOTE — TELEPHONE ENCOUNTER
Result note for Nuclear stress test with myocardial perfusion     ----- Message from Connor Mortimer, PA-C sent at 2/5/2024  7:18 AM EST -----  Stress test is negative for any significant concerns. No further workup if her symptoms are stable.

## 2024-02-05 NOTE — PROGRESS NOTES
an      average HER2 copy number <4.0 signals per cell. This is a NEGATIVE result.        Cancer Case Summary:                Procedure      Needle biopsy           Specimen Laterality      Left           TUMOR           Tumor Site      Not specified           Histologic Type      Invasive carcinoma of no special type (ductal)           Histologic Grade ([place] Histologic Score)      Glandular (Acinar) / Tubular Differentiation      Score 3 (less than 10% of tumor area forming glandular / tubular structures)           Nuclear Pleomorphism      Score 1 (Nuclei small with little increase in size in comparison with normal breast      epithelial cells, regular outlines, uniform nuclear chromatin, little variation in size)           Mitotic Rate      Score 1      Overall Grade      Grade 1 (scores of 3, 4 or 5)           Tumor Size      Greatest dimension of largest invasive focus greater than 1 mm (specify exact measurement in      Millimeters (mm)): 3 mm           Ductal Carcinoma In Situ (DCIS)      Present           Architectural Patterns      [place]           Nuclear Grade      Grade III (high)           Necrosis      Present, central (expansive \"comedo\" necrosis)        Lymphovascular Invasion      Present           Microcalcifications      Present in DCIS      6/16/2023 Imaging    WILLIS diagnostic B/L           6/16/2023 Imaging    U/S Breast Left    FINDINGS:        LEFT Breast:    There is no ultrasound abnormality in the left breast to correlate to the    palpable area. The palpable area is located at the 11:00, 5 cm from the    nipple position. The palpable abnormality appears to represent a ridge of    normal breast tissue. No suspicious solid or cystic masses are seen.    ___________________________________        IMPRESSION:    There is no ultrasound abnormality in the left breast to correlate to the    palpable area. The palpable abnormality appears to represent a ridge of    normal breast tissue. No

## 2024-02-09 ENCOUNTER — SOCIAL WORK (OUTPATIENT)
Dept: RADIATION ONCOLOGY | Age: 49
End: 2024-02-09

## 2024-02-19 ENCOUNTER — HOSPITAL ENCOUNTER (OUTPATIENT)
Dept: CT IMAGING | Age: 49
Discharge: HOME | End: 2024-02-19

## 2024-02-19 ENCOUNTER — APPOINTMENT (OUTPATIENT)
Dept: RADIATION ONCOLOGY | Age: 49
End: 2024-02-19
Payer: COMMERCIAL

## 2024-02-19 DIAGNOSIS — C50.912 MALIGNANT NEOPLASM OF LEFT BREAST IN FEMALE, ESTROGEN RECEPTOR POSITIVE, UNSPECIFIED SITE OF BREAST (HCC): ICD-10-CM

## 2024-02-19 DIAGNOSIS — Z17.0 MALIGNANT NEOPLASM OF LEFT BREAST IN FEMALE, ESTROGEN RECEPTOR POSITIVE, UNSPECIFIED SITE OF BREAST (HCC): ICD-10-CM

## 2024-02-19 PROCEDURE — 77263 THER RADIOLOGY TX PLNG CPLX: CPT | Performed by: RADIOLOGY

## 2024-02-19 PROCEDURE — 3209999900 CT GUIDE RADIATION THERAPY NO CHARGE

## 2024-02-19 PROCEDURE — 77334 RADIATION TREATMENT AID(S): CPT | Performed by: RADIOLOGY

## 2024-02-20 ENCOUNTER — HOSPITAL ENCOUNTER (EMERGENCY)
Age: 49
Discharge: HOME | End: 2024-02-20
Payer: COMMERCIAL

## 2024-02-20 VITALS
WEIGHT: 195 LBS | BODY MASS INDEX: 29.65 KG/M2 | SYSTOLIC BLOOD PRESSURE: 142 MMHG | TEMPERATURE: 98.1 F | HEART RATE: 69 BPM | OXYGEN SATURATION: 96 % | DIASTOLIC BLOOD PRESSURE: 95 MMHG | RESPIRATION RATE: 18 BRPM

## 2024-02-20 DIAGNOSIS — J02.9 ACUTE PHARYNGITIS, UNSPECIFIED ETIOLOGY: Primary | ICD-10-CM

## 2024-02-20 LAB
FLUAV AG SPEC QL: NEGATIVE
FLUBV AG SPEC QL: NEGATIVE
SARS-COV-2 RDRP RESP QL NAA+PROBE: NOT  DETECTED

## 2024-02-20 PROCEDURE — 87804 INFLUENZA ASSAY W/OPTIC: CPT

## 2024-02-20 PROCEDURE — 99214 OFFICE O/P EST MOD 30 MIN: CPT | Performed by: NURSE PRACTITIONER

## 2024-02-20 PROCEDURE — 99213 OFFICE O/P EST LOW 20 MIN: CPT

## 2024-02-20 PROCEDURE — 87635 SARS-COV-2 COVID-19 AMP PRB: CPT

## 2024-02-20 RX ORDER — AMOXICILLIN 500 MG/1
500 CAPSULE ORAL 2 TIMES DAILY
Qty: 20 CAPSULE | Refills: 0 | Status: SHIPPED | OUTPATIENT
Start: 2024-02-20 | End: 2024-03-01

## 2024-02-20 ASSESSMENT — ENCOUNTER SYMPTOMS
COUGH: 1
SHORTNESS OF BREATH: 0
RHINORRHEA: 1
CHEST TIGHTNESS: 0
NAUSEA: 0
DIARRHEA: 0
VOMITING: 0
SORE THROAT: 1

## 2024-02-20 ASSESSMENT — PAIN DESCRIPTION - DESCRIPTORS: DESCRIPTORS: ACHING

## 2024-02-20 ASSESSMENT — PAIN - FUNCTIONAL ASSESSMENT
PAIN_FUNCTIONAL_ASSESSMENT: ACTIVITIES ARE NOT PREVENTED
PAIN_FUNCTIONAL_ASSESSMENT: 0-10

## 2024-02-20 ASSESSMENT — PAIN DESCRIPTION - LOCATION: LOCATION: FACE

## 2024-02-20 ASSESSMENT — PAIN SCALES - GENERAL: PAINLEVEL_OUTOF10: 4

## 2024-02-20 ASSESSMENT — PAIN DESCRIPTION - PAIN TYPE: TYPE: ACUTE PAIN

## 2024-02-20 NOTE — ED PROVIDER NOTES
[facility] URGENT CARE  Urgent Care Encounter       CHIEF COMPLAINT       Chief Complaint   Patient presents with    Cough    Pharyngitis    Nasal Congestion       Nurses Notes reviewed and I agree except as noted in the HPI.  HISTORY OF PRESENT ILLNESS   Tess Marley is a 48 y.o. female who presents to the [place] urgent care for evaluation of cough and pharyngitis.  Patient reports symptoms started 2 to 3 days ago.  Does report that she has cancer and was on chemotherapy 1 to 2 months ago.  She does report that she is due for radiation soon.  She reports her symptoms as cough, pharyngitis, congestion, rhinorrhea, fatigue and myalgia.    The history is provided by the patient. No  was used.       REVIEW OF SYSTEMS     Review of Systems   Constitutional:  Positive for fatigue. Negative for activity change, appetite change, chills and fever.   HENT:  Positive for congestion, rhinorrhea and sore throat. Negative for ear discharge and ear pain.    Respiratory:  Positive for cough. Negative for chest tightness and shortness of breath.    Cardiovascular:  Negative for chest pain.   Gastrointestinal:  Negative for diarrhea, nausea and vomiting.   Genitourinary:  Negative for dysuria.   Musculoskeletal:  Positive for myalgias.   Skin:  Negative for rash.   Allergic/Immunologic: Negative for environmental allergies and food allergies.   Neurological:  Negative for dizziness and headaches.       PAST MEDICAL HISTORY         Diagnosis Date    Anxiety     Cancer (HCC)     Migraine        SURGICALHISTORY     Patient  has a past surgical history that includes Tonsillectomy; Tubal ligation; Breast surgery; and Endometrial ablation (2019).    CURRENT MEDICATIONS       Discharge Medication List as of 2/20/2024  9:19 AM        CONTINUE these medications which have NOT CHANGED    Details   Pseudoephedrine-Naproxen Na (SINUS & COLD-D PO) Take by mouthHistorical Med      gabapentin (NEURONTIN)  MD  [address] [address]      DISCHARGE MEDICATIONS:  Discharge Medication List as of 2/20/2024  9:19 AM        START taking these medications    Details   amoxicillin (AMOXIL) 500 MG capsule Take 1 capsule by mouth 2 times daily for 10 days, Disp-20 capsule, R-0Normal             Discharge Medication List as of 2/20/2024  9:19 AM        STOP taking these medications       naproxen (NAPROSYN) 500 MG tablet Comments:   Reason for Stopping:         ibuprofen (ADVIL;MOTRIN) 600 MG tablet Comments:   Reason for Stopping:               Discharge Medication List as of 2/20/2024  9:19 AM          ABIGAIL Aguilar - CNP    (Please note that portions of this note were completed with a voice recognition program. Efforts were made to edit the dictations but occasionally words are mis-transcribed.)            Ari Figueroa, ABIGAIL - CNP  02/20/24 0941

## 2024-02-28 ENCOUNTER — SOCIAL WORK (OUTPATIENT)
Dept: RADIATION ONCOLOGY | Age: 49
End: 2024-02-28

## 2024-02-28 NOTE — PROGRESS NOTES
- This staff received the following email from Tess on this date:    \"Good afternoon. I am about to start radiation with you guys and was trying to continue my short term disability until I am done with radiation. I drive a forklift and if I am going to be tender and possible burns there is no way I am going to be able to drive safely. Also the fatigue. Would you be able to help me with this. My short term is up on March 4th with my surgeon from [place]. If you are able to help I just need a letter with the return to work date. Thank you for your time.      Tess Marley  [phone]\"    - Follow up conversation with Dr MILDRED Viera revealed that Tess will begin radiation treatment sometime next week. Dr Viera agreed to provide a letter indicating his advisement Tess not perform her work duties while in treatment continuing to one month after treatment is complete.   - This letter will be available for Tess to  from the Cancer Center at her convenience next week. (Her short-term disability ends March 4th)  - Further phone conversation with Tess proposed the option that she pursue long-term disability, regardless of the treatment outcome. After providing her with the necessary procedural education of a disability determination, she agreed to pursue this avenue.   - This staff will assist in the disability application at Tess's convenience. Continued support will be available per her requests.

## 2024-03-01 ENCOUNTER — SOCIAL WORK (OUTPATIENT)
Dept: RADIATION ONCOLOGY | Age: 49
End: 2024-03-01

## 2024-03-01 NOTE — PROGRESS NOTES
- This staff was contacted by [employer] requesting that we complete her Long-term Disability paperwork.   - I took her request for [facility] to the requested provider (Dr TRU Viera). After reviewing, we realized to increase your opportunity of receiving long-term disability, Tess should contact the surgeon who performed her surgery to complete. Our rationale for this determination is because radiation will not be the cause of your inability to work.   - Knowing this makes the situation complicated for Tess, but, since Dr Viera has yet to treat her, he can't sign a document stating that he has. The treatment plan would originate from her surgeon and radiation therapy is one of the treatment modes suggested in the plan to give her a better chance of survival.   - This staff is providing the names and contact information of her surgeon's from [facility]. Both were contacted and their response is included in this note.     Viraj Ma MD (Plastic Surgeon) - Had to leave a voicemail for his nurse - 3/1/24  [address]   [address]   [place] [address] [phone] (Work)   [phone] (Fax)     Kelly Gonzales MD (General Surgeon) - Spoke with Danisha and she asked that I fax the document to Dr Gonzales. She will direct it to the appropriate source to complete - 3/1/24  [address]   [address], [address]   [place] [address]   Phone: [phone]   Fax: [phone]     - An email with the content of this information has been sent to Tess. ([email or web address]).   - This staff will remain available to assist as needed.

## 2024-03-01 NOTE — PROGRESS NOTES
- A letter from Dr Viera was provided to Tess via email ([email or web address]) addressing her pending need for an extension on her short term disability was sent to her this day.  - A call was then placed to inform her of the action.   - This staff provided her with my contact information should additional information need provided.  - This staff will remain available for support as needed.

## 2024-03-05 ENCOUNTER — HOSPITAL ENCOUNTER (OUTPATIENT)
Dept: RADIATION ONCOLOGY | Age: 49
Discharge: HOME | End: 2024-03-05
Payer: COMMERCIAL

## 2024-03-05 VITALS
TEMPERATURE: 98 F | SYSTOLIC BLOOD PRESSURE: 144 MMHG | RESPIRATION RATE: 16 BRPM | OXYGEN SATURATION: 96 % | WEIGHT: 202.16 LBS | HEART RATE: 70 BPM | BODY MASS INDEX: 30.74 KG/M2 | DIASTOLIC BLOOD PRESSURE: 90 MMHG

## 2024-03-05 DIAGNOSIS — C50.912 BREAST CANCER METASTASIZED TO AXILLARY LYMPH NODE, LEFT (HCC): Primary | ICD-10-CM

## 2024-03-05 DIAGNOSIS — C77.3 BREAST CANCER METASTASIZED TO AXILLARY LYMPH NODE, LEFT (HCC): Primary | ICD-10-CM

## 2024-03-05 PROCEDURE — 77014 CHG CT GUIDANCE RADIATION THERAPY FLDS PLACEMENT: CPT | Performed by: RADIOLOGY

## 2024-03-05 PROCEDURE — 77385 HC NTSTY MODUL RAD TX DLVR SMPL: CPT | Performed by: RADIOLOGY

## 2024-03-05 NOTE — PROGRESS NOTES
[facility]          [address], [address]        [place], [place] [address]        O: [phone]        F: [phone]       [employer]            Dr. Viraj Viera MD MS          Dr. Jessi Nunes MD PhD    ON TREATMENT VISIT (OTV) NOTE     Date of Service: 3/5/2024  Patient ID: Tess Marley   : 1975  MRN: [id number]   Acct Number: [id number]     RADIATION ONCOLOGY ATTENDING:  Viraj Viera MD MS    DIAGNOSIS:   Cancer Staging   Breast cancer metastasized to axillary lymph node, left (HCC)  Staging form: Breast, AJCC 8th Edition  - Clinical stage from 2023: Stage IB (cT1c, cN1, cM0, G1, ER+, NH+, HER2-) - Signed by Viraj Viera MD on 2024  - Pathologic stage from 2023: No Stage Recommended (ypT1a, pN1a, cM0, GX, ER+, NH+, HER2-) - Signed by Viraj Viera MD on 2024      Treatment Area: Breast     Current Total Dose(cGy): 267  Current Fraction:   Final/Cumulative Rx. Dose (cGy): 5005    Patient was seen today for weekly visit.     Wt Readings from Last 3 Encounters:   24 91.7 kg (202 lb 2.6 oz)   24 88.5 kg (195 lb)   24 92.4 kg (203 lb 12.8 oz)       BP (!) 144/90   Pulse 70   Temp 98 °F (36.7 °C) (Infrared)   Resp 16   Wt 91.7 kg (202 lb 2.6 oz)   SpO2 96%   BMI 30.74 kg/m²     Lab Results   Component Value Date    WBC 5.2 2024    HGB 11.8 (L) 2024    HCT 36.2 (L) 2024     2024       Comfort Alteration  Fatigue:Must curtail daily activities even with rest periods and early bedtime    Pain Location: Denies  Pain Intensity (Current): 0 No Pain  Pain Treatment: N/A  Pain Relief: n/a  Hot Flashes/Flushes: Moderate and more than 1 per day    Emotional Alteration:   Coping: effective    Nutritional Alteration  Anorexia: none   Nausea: No nausea noted  Vomiting: No vomiting   Dyspepsia/Heartburn: None    Skin Alteration   Skin reaction:

## 2024-03-06 ENCOUNTER — HOSPITAL ENCOUNTER (OUTPATIENT)
Dept: RADIATION ONCOLOGY | Age: 49
Discharge: HOME | End: 2024-03-06
Payer: COMMERCIAL

## 2024-03-06 PROCEDURE — 77385 HC NTSTY MODUL RAD TX DLVR SMPL: CPT | Performed by: RADIOLOGY

## 2024-03-06 PROCEDURE — 77014 CHG CT GUIDANCE RADIATION THERAPY FLDS PLACEMENT: CPT | Performed by: RADIOLOGY

## 2024-03-06 NOTE — PROGRESS NOTES
[facility]  [address] [id number]  Phone: [phone]   Toll Free: [phone]   Fax: [phone]    RADIATION ONCOLOGY EDUCATION    CHIEF COMPLAINT: Tess presents to radiation oncology today for education regarding treatment to the L Breast.      PLAN:   Expected and potential side effects were discussed in detail, along with written handouts.  Skin care and moisturization instructions were discussed in detail.   Patient is current with Physical Therapy.  Patient was informed of dietician that is available weekly and as needed.  Educated on weekly on treatment visit to meet with Physician to monitor side effects and treatment course.  Informed patient that Physician is available at any time to discuss radiation side effects/concerns through out treatment course.  Tess had the opportunity to ask questions, and indicated that all questions were satisfactorily addressed.

## 2024-03-07 ENCOUNTER — HOSPITAL ENCOUNTER (OUTPATIENT)
Dept: RADIATION ONCOLOGY | Age: 49
Discharge: HOME | End: 2024-03-07
Payer: COMMERCIAL

## 2024-03-07 PROCEDURE — 77385 HC NTSTY MODUL RAD TX DLVR SMPL: CPT | Performed by: RADIOLOGY

## 2024-03-08 ENCOUNTER — SOCIAL WORK (OUTPATIENT)
Dept: RADIATION ONCOLOGY | Age: 49
End: 2024-03-08

## 2024-03-08 ENCOUNTER — HOSPITAL ENCOUNTER (OUTPATIENT)
Dept: RADIATION ONCOLOGY | Age: 49
Discharge: HOME | End: 2024-03-08
Payer: COMMERCIAL

## 2024-03-08 PROCEDURE — 77385 HC NTSTY MODUL RAD TX DLVR SMPL: CPT | Performed by: RADIOLOGY

## 2024-03-09 ENCOUNTER — HOSPITAL ENCOUNTER (EMERGENCY)
Age: 49
Discharge: HOME | End: 2024-03-09
Attending: FAMILY MEDICINE
Payer: COMMERCIAL

## 2024-03-09 VITALS
HEIGHT: 68 IN | OXYGEN SATURATION: 96 % | BODY MASS INDEX: 30.31 KG/M2 | HEART RATE: 84 BPM | TEMPERATURE: 99 F | SYSTOLIC BLOOD PRESSURE: 132 MMHG | RESPIRATION RATE: 18 BRPM | WEIGHT: 200 LBS | DIASTOLIC BLOOD PRESSURE: 96 MMHG

## 2024-03-09 DIAGNOSIS — K92.1 BLOOD IN STOOL: Primary | ICD-10-CM

## 2024-03-09 LAB
ABO: NORMAL
ALBUMIN SERPL BCG-MCNC: 4.2 G/DL (ref 3.5–5.1)
ALP SERPL-CCNC: 82 U/L (ref 38–126)
ALT SERPL W/O P-5'-P-CCNC: 20 U/L (ref 11–66)
ANION GAP SERPL CALC-SCNC: 11 MEQ/L (ref 8–16)
ANTIBODY SCREEN: NORMAL
APTT PPP: 29.8 SECONDS (ref 22–38)
AST SERPL-CCNC: 17 U/L (ref 5–40)
BASOPHILS ABSOLUTE: 0 THOU/MM3 (ref 0–0.1)
BASOPHILS NFR BLD AUTO: 0.6 %
BILIRUB SERPL-MCNC: 0.5 MG/DL (ref 0.3–1.2)
BUN SERPL-MCNC: 16 MG/DL (ref 7–22)
CALCIUM SERPL-MCNC: 9.7 MG/DL (ref 8.5–10.5)
CHLORIDE SERPL-SCNC: 103 MEQ/L (ref 98–111)
CO2 SERPL-SCNC: 26 MEQ/L (ref 23–33)
CREAT SERPL-MCNC: 0.8 MG/DL (ref 0.4–1.2)
DEPRECATED RDW RBC AUTO: 43.7 FL (ref 35–45)
EOSINOPHIL NFR BLD AUTO: 1.3 %
EOSINOPHILS ABSOLUTE: 0.1 THOU/MM3 (ref 0–0.4)
ERYTHROCYTE [DISTWIDTH] IN BLOOD BY AUTOMATED COUNT: 13.7 % (ref 11.5–14.5)
GFR SERPL CREATININE-BSD FRML MDRD: > 60 ML/MIN/1.73M2
GLUCOSE SERPL-MCNC: 97 MG/DL (ref 70–108)
HCT VFR BLD AUTO: 41.2 % (ref 37–47)
HEMOCCULT STL QL: POSITIVE
HGB BLD-MCNC: 14 GM/DL (ref 12–16)
IMM GRANULOCYTES # BLD AUTO: 0.01 THOU/MM3 (ref 0–0.07)
IMM GRANULOCYTES NFR BLD AUTO: 0.2 %
INR PPP: 1.02 (ref 0.85–1.13)
LYMPHOCYTES ABSOLUTE: 1.1 THOU/MM3 (ref 1–4.8)
LYMPHOCYTES NFR BLD AUTO: 17.7 %
MCH RBC QN AUTO: 29.5 PG (ref 26–33)
MCHC RBC AUTO-ENTMCNC: 34 GM/DL (ref 32.2–35.5)
MCV RBC AUTO: 86.7 FL (ref 81–99)
MONOCYTES ABSOLUTE: 0.5 THOU/MM3 (ref 0.4–1.3)
MONOCYTES NFR BLD AUTO: 7.3 %
NEUTROPHILS NFR BLD AUTO: 72.9 %
NRBC BLD AUTO-RTO: 0 /100 WBC
OSMOLALITY SERPL CALC.SUM OF ELEC: 280.5 MOSMOL/KG (ref 275–300)
PLATELET # BLD AUTO: 272 THOU/MM3 (ref 130–400)
PMV BLD AUTO: 9.4 FL (ref 9.4–12.4)
POTASSIUM SERPL-SCNC: 3.8 MEQ/L (ref 3.5–5.2)
PROT SERPL-MCNC: 7.4 G/DL (ref 6.1–8)
RBC # BLD AUTO: 4.75 MILL/MM3 (ref 4.2–5.4)
RH FACTOR: NORMAL
SEGMENTED NEUTROPHILS ABSOLUTE COUNT: 4.6 THOU/MM3 (ref 1.8–7.7)
SODIUM SERPL-SCNC: 140 MEQ/L (ref 135–145)
WBC # BLD AUTO: 6.3 THOU/MM3 (ref 4.8–10.8)

## 2024-03-09 PROCEDURE — 85025 COMPLETE CBC W/AUTO DIFF WBC: CPT

## 2024-03-09 PROCEDURE — 86900 BLOOD TYPING SEROLOGIC ABO: CPT

## 2024-03-09 PROCEDURE — 80053 COMPREHEN METABOLIC PANEL: CPT

## 2024-03-09 PROCEDURE — 82272 OCCULT BLD FECES 1-3 TESTS: CPT

## 2024-03-09 PROCEDURE — 99215 OFFICE O/P EST HI 40 MIN: CPT

## 2024-03-09 PROCEDURE — 36415 COLL VENOUS BLD VENIPUNCTURE: CPT

## 2024-03-09 PROCEDURE — 86850 RBC ANTIBODY SCREEN: CPT

## 2024-03-09 PROCEDURE — 85730 THROMBOPLASTIN TIME PARTIAL: CPT

## 2024-03-09 PROCEDURE — 86901 BLOOD TYPING SEROLOGIC RH(D): CPT

## 2024-03-09 PROCEDURE — 85610 PROTHROMBIN TIME: CPT

## 2024-03-09 PROCEDURE — 99283 EMERGENCY DEPT VISIT LOW MDM: CPT

## 2024-03-09 PROCEDURE — 99214 OFFICE O/P EST MOD 30 MIN: CPT

## 2024-03-09 ASSESSMENT — PAIN - FUNCTIONAL ASSESSMENT
PAIN_FUNCTIONAL_ASSESSMENT: NONE - DENIES PAIN

## 2024-03-09 ASSESSMENT — ENCOUNTER SYMPTOMS
ABDOMINAL PAIN: 0
BLOOD IN STOOL: 1
VOMITING: 0
NAUSEA: 0
RECTAL PAIN: 0
DIARRHEA: 0

## 2024-03-09 NOTE — ED PROVIDER NOTES
(NEURONTIN) 100 MG capsule, Take 1 capsule by mouth in the morning and at bedtime., Disp: , Rfl:     pantoprazole (PROTONIX) 40 MG tablet, Take 1 tablet by mouth daily, Disp: 30 tablet, Rfl: 5    DULoxetine (CYMBALTA) 30 MG extended release capsule, Take 1 capsule by mouth daily, Disp: , Rfl:     Current Discharge Medication List        CONTINUE these medications which have NOT CHANGED    Details   betamethasone valerate (VALISONE) 0.1 % cream Apply topically 2 times daily.  Qty: 90 g, Refills: 0    Associated Diagnoses: Breast cancer metastasized to axillary lymph node, left (HCC)      Pseudoephedrine-Naproxen Na (SINUS & COLD-D PO) Take by mouth      gabapentin (NEURONTIN) 100 MG capsule Take 1 capsule by mouth in the morning and at bedtime.      pantoprazole (PROTONIX) 40 MG tablet Take 1 tablet by mouth daily  Qty: 30 tablet, Refills: 5      DULoxetine (CYMBALTA) 30 MG extended release capsule Take 1 capsule by mouth daily               SOCIAL HISTORY     Social History     Social History Narrative    Not on file     Social History     Tobacco Use    Smoking status: Never    Smokeless tobacco: Never   Vaping Use    Vaping Use: Never used   Substance Use Topics    Alcohol use: Not Currently     Comment: socially    Drug use: No         ALLERGIES   No Known Allergies      FAMILY HISTORY     Family History   Problem Relation Age of Onset    Cancer Mother         Cervical    Cancer Paternal Aunt         Lung    Cancer Paternal Uncle         Kidney    Cancer Maternal Grandmother         Lymphoma    Breast Cancer Paternal Grandmother          PHYSICAL EXAM     ED Triage Vitals [03/09/24 1049]   BP Temp Temp Source Pulse Respirations SpO2 Height Weight - Scale   (!) 145/84 99 °F (37.2 °C) Oral 83 16 97 % 1.727 m (5' 8\") 90.7 kg (200 lb)     Initial vital signs and nursing assessment reviewed and abnormal from hypertensive . Body mass index is 30.41 kg/m².     Additional Vital Signs:  Vitals:    03/09/24 1218   BP:  pertinent diagnostic studies and exam findings were discussed with the patient/surrogate.   The patient’s provisional diagnosis and plan of care were discussed with the patient and present family who expressed understanding.   Medications were reviewed and indications and risks of medications were discussed with the patient/family present.   Strict return precautions and follow up instructions were discussed with the patient prior to discharge, with which the patient agrees.     FINAL DIAGNOSES:  Final diagnoses:   Blood in stool       Condition: condition: stable  Dispo: Discharge to home  DISPOSITION Decision To Discharge 03/09/2024 01:23:24 PM      This transcription was electronically signed. It was dictated by use of voice recognition software and electronically transcribed. The transcription may contain errors not detected in proofreading.

## 2024-03-09 NOTE — DISCHARGE INSTRUCTIONS
You were seen and evaluated in the emergency department today for blood in your stool.  There was minimal blood on exam today.  Your blood work was stable with no sign of significant blood loss.  We recommend follow-up with gastroenterology over the next week for further evaluation outpatient.    Return to emergency department immediately if you have significant worsening of blood in your stool, blood in the emesis, severe abdominal pain, loss of consciousness, any other worrisome changes.

## 2024-03-09 NOTE — ED NOTES
Patient presents to ED from [facility] with complaint of blood in stools today.  Patient states she is a breast cancer patient with left breast lumpectomy and chemotherapy completed , now on radiation.  Patient denies pain.  Patient states stool is formed with bright red blood, denies diarrhea.

## 2024-03-09 NOTE — ED NOTES
VITA Hwang called report to [facility] ED and spoke with PING Rodriguez.     Magdaleno Li RN  03/09/24 1778

## 2024-03-09 NOTE — ED PROVIDER NOTES
[facility] URGENT CARE  Urgent Care Encounter       CHIEF COMPLAINT       Chief Complaint   Patient presents with    Melena       Nurses Notes reviewed and I agree except as noted in the HPI.  HISTORY OF PRESENT ILLNESS   Tess Marley is a 48 y.o. female who presents with complaints of blood in stool.  Patient reports that she noticed it today.  Patient reports normal bowel movements with bright red blood today.  Patient denies pain with having a bowel movement.  Patient reports that she does have breast cancer and just went through her first radiation treatment on Tuesday.    The history is provided by the patient.       REVIEW OF SYSTEMS     Review of Systems   Gastrointestinal:  Positive for blood in stool. Negative for abdominal pain, diarrhea, nausea, rectal pain and vomiting.   All other systems reviewed and are negative.      PAST MEDICAL HISTORY         Diagnosis Date    Anxiety     Cancer (HCC)     Migraine        SURGICALHISTORY     Patient  has a past surgical history that includes Tonsillectomy; Tubal ligation; Breast surgery; and Endometrial ablation (2019).    CURRENT MEDICATIONS       Previous Medications    BETAMETHASONE VALERATE (VALISONE) 0.1 % CREAM    Apply topically 2 times daily.    DULOXETINE (CYMBALTA) 30 MG EXTENDED RELEASE CAPSULE    Take 1 capsule by mouth daily    GABAPENTIN (NEURONTIN) 100 MG CAPSULE    Take 1 capsule by mouth in the morning and at bedtime.    PANTOPRAZOLE (PROTONIX) 40 MG TABLET    Take 1 tablet by mouth daily    PSEUDOEPHEDRINE-NAPROXEN NA (SINUS & COLD-D PO)    Take by mouth       ALLERGIES     Patient is has No Known Allergies.    Patients   There is no immunization history on file for this patient.    FAMILY HISTORY     Patient's family history includes Breast Cancer in her paternal grandmother; Cancer in her maternal grandmother, mother, paternal aunt, and paternal uncle.    SOCIAL HISTORY     Patient  reports that she has never smoked. She has  never used smokeless tobacco. She reports that she does not currently use alcohol. She reports that she does not use drugs.    PHYSICAL EXAM     ED TRIAGE VITALS  BP: (!) 145/84, Temp: 99 °F (37.2 °C), Pulse: 83, Respirations: 16, SpO2: 97 %,Estimated body mass index is 30.41 kg/m² as calculated from the following:    Height as of this encounter: 1.727 m (5' 8\").    Weight as of this encounter: 90.7 kg (200 lb).,No LMP recorded. Patient has had an ablation.    Physical Exam  Vitals and nursing note reviewed. Exam conducted with a chaperone present (PING Dolan).   Constitutional:       Appearance: Normal appearance. She is normal weight.   Cardiovascular:      Rate and Rhythm: Normal rate and regular rhythm.      Heart sounds: Normal heart sounds.   Pulmonary:      Effort: Pulmonary effort is normal.      Breath sounds: Normal breath sounds.   Abdominal:      Tenderness: There is no abdominal tenderness.      Comments: Blood in underwear  No presence of hemorrhoid    Skin:     General: Skin is warm and dry.      Capillary Refill: Capillary refill takes less than 2 seconds.   Neurological:      General: No focal deficit present.      Mental Status: She is alert and oriented to person, place, and time.   Psychiatric:         Mood and Affect: Affect is tearful.         DIAGNOSTIC RESULTS     Labs:No results found for this visit on 03/09/24.    IMAGING:    No orders to display         EKG:      URGENT CARE COURSE:     Vitals:    03/09/24 1049   BP: (!) 145/84   Pulse: 83   Resp: 16   Temp: 99 °F (37.2 °C)   TempSrc: Oral   SpO2: 97%   Weight: 90.7 kg (200 lb)   Height: 1.727 m (5' 8\")       Medications - No data to display         PROCEDURES:  None    FINAL IMPRESSION      1. Blood in stool          DISPOSITION/ PLAN   Pt has maroon colored blood on white underwear during rectal examination. No Hemorrhoid present. Pt to go to ER for further evaluation. Report called to ER, JOANNA FENG.        PATIENT REFERRED TO:  Ander

## 2024-03-09 NOTE — ED TRIAGE NOTES
Pt presents to the urgent care with complaints of blood in her stool. Pt repots today she had two bowel movements that had blood in her stool. Pt reports stool was bright red blood. Pt denies any abdominal pain or straining while having bowel movement. Pt reports she just started radiation on Tuesday due to having breast cancer.

## 2024-03-11 ENCOUNTER — HOSPITAL ENCOUNTER (OUTPATIENT)
Dept: RADIATION ONCOLOGY | Age: 49
Discharge: HOME | End: 2024-03-11
Payer: COMMERCIAL

## 2024-03-11 PROCEDURE — 77014 CHG CT GUIDANCE RADIATION THERAPY FLDS PLACEMENT: CPT | Performed by: RADIOLOGY

## 2024-03-11 PROCEDURE — 77336 RADIATION PHYSICS CONSULT: CPT | Performed by: RADIOLOGY

## 2024-03-11 PROCEDURE — 77385 HC NTSTY MODUL RAD TX DLVR SMPL: CPT | Performed by: RADIOLOGY

## 2024-03-12 ENCOUNTER — HOSPITAL ENCOUNTER (OUTPATIENT)
Dept: RADIATION ONCOLOGY | Age: 49
Discharge: HOME | End: 2024-03-12
Payer: COMMERCIAL

## 2024-03-12 VITALS
BODY MASS INDEX: 30.2 KG/M2 | SYSTOLIC BLOOD PRESSURE: 131 MMHG | OXYGEN SATURATION: 96 % | TEMPERATURE: 97.7 F | DIASTOLIC BLOOD PRESSURE: 71 MMHG | WEIGHT: 198.63 LBS | RESPIRATION RATE: 18 BRPM | HEART RATE: 75 BPM

## 2024-03-12 PROCEDURE — 77385 HC NTSTY MODUL RAD TX DLVR SMPL: CPT | Performed by: RADIOLOGY

## 2024-03-12 PROCEDURE — 77014 CHG CT GUIDANCE RADIATION THERAPY FLDS PLACEMENT: CPT | Performed by: RADIOLOGY

## 2024-03-12 NOTE — PROGRESS NOTES
changes noted    Ventilation Alteration  Cough: Nonproductive  Hemoptysis: None  Dyspnea: Dyspnea on exertion   Mucous Quantity/Quality: n/a    Additional Comments: Using Betamethasone and CeraVe BID and CeraVe at night.    MEDICATIONS:     Current Outpatient Medications   Medication Sig Dispense Refill    betamethasone valerate (VALISONE) 0.1 % cream Apply topically 2 times daily. 90 g 0    Pseudoephedrine-Naproxen Na (SINUS & COLD-D PO) Take by mouth      gabapentin (NEURONTIN) 100 MG capsule Take 1 capsule by mouth in the morning and at bedtime.      pantoprazole (PROTONIX) 40 MG tablet Take 1 tablet by mouth daily 30 tablet 5    DULoxetine (CYMBALTA) 30 MG extended release capsule Take 1 capsule by mouth daily       No current facility-administered medications for this encounter.        PHYSICAL EXAM:       ECO - Symptomatic but completely ambulatory (Restricted in physically strenuous activity but ambulatory and able to carry out work of a light or sedentary nature. For example, light housework, office work)    General: NAD, AO x 3, Mentation is clear with appropriate affect.  HEENT: Normocephalic, atraumatic  Thorax:  Unlabored  Breasts:  Treated breast: no skin changes in treatment area  Neuro:  Cranial nerves grossly intact; no focal deficits  Skin - treatment portal: Skin intact with no treatment effects noted    Chemotherapy Update: Systemic therapy prior to Radiation therapy    Treatment Imaging: All imaging reviewed and approved by Dr. Viera/Des    ASSESSMENT: No significant radiation side effects. Responding appropriately to symptomatic management.     New medications, diagnostic results: Continue treatment as planned    PLAN: Again reviewed potential side effects of radiation for the patient's treatment.    Continue local/topical care.    Continue current radiation course as prescribed.

## 2024-03-13 ENCOUNTER — HOSPITAL ENCOUNTER (OUTPATIENT)
Dept: RADIATION ONCOLOGY | Age: 49
Discharge: HOME | End: 2024-03-13
Payer: COMMERCIAL

## 2024-03-13 PROCEDURE — 77385 HC NTSTY MODUL RAD TX DLVR SMPL: CPT | Performed by: RADIOLOGY

## 2024-03-13 PROCEDURE — 77014 CHG CT GUIDANCE RADIATION THERAPY FLDS PLACEMENT: CPT | Performed by: RADIOLOGY

## 2024-03-14 ENCOUNTER — HOSPITAL ENCOUNTER (OUTPATIENT)
Dept: RADIATION ONCOLOGY | Age: 49
Discharge: HOME | End: 2024-03-14
Payer: COMMERCIAL

## 2024-03-14 PROCEDURE — 77014 CHG CT GUIDANCE RADIATION THERAPY FLDS PLACEMENT: CPT | Performed by: RADIOLOGY

## 2024-03-14 PROCEDURE — 77385 HC NTSTY MODUL RAD TX DLVR SMPL: CPT | Performed by: RADIOLOGY

## 2024-03-15 ENCOUNTER — HOSPITAL ENCOUNTER (OUTPATIENT)
Dept: RADIATION ONCOLOGY | Age: 49
Discharge: HOME | End: 2024-03-15
Payer: COMMERCIAL

## 2024-03-15 PROCEDURE — 77385 HC NTSTY MODUL RAD TX DLVR SMPL: CPT | Performed by: RADIOLOGY

## 2024-03-15 NOTE — DISCHARGE INSTRUCTIONS
PATIENT DISCHARGE INSTRUCTIONS    Remember that side effects present at the end of your treatments will improve within a few weeks after the last treatment.  Eat well balanced meals even though your treatments are finished.  This will help speed the healing process. Continue any special diets prescribed to control side effects until these side effects have been resolved.  Get plenty of rest.  If you have experienced fatigue and/or weakness, this may continue for several weeks after your last treatment.  Continue with your daily activities according to the way you feel.  Continue to be gentle with your skin.  Follow your present skin care instructions until your follow-up visit.  IF YOU DEVELOP ANY CHANGES IN YOUR SKIN IN THE AREA TREATED WITH RADIATION, PLEASE CALL THE RADIATION ONCOLOGY NURSE -[phone].  Protect your skin from any injury and avoid direct sun exposure in the treatment area.  The skin in the treated area may always be more sensitive than the rest of your skin.  Always use SPF 30 or higher sun block if you will be in the sun and cannot avoid exposure.  Please contact your referring physician for a follow-up appointment in addition to your Radiation Oncology appointment.  Presence of pain:   Medication Taper: No    See Instructions Dated: N/A  Follow up orders: Will be discussed at Follow-Up

## 2024-03-18 ENCOUNTER — HOSPITAL ENCOUNTER (OUTPATIENT)
Dept: RADIATION ONCOLOGY | Age: 49
Discharge: HOME | End: 2024-03-18
Payer: COMMERCIAL

## 2024-03-18 PROCEDURE — 77385 HC NTSTY MODUL RAD TX DLVR SMPL: CPT | Performed by: RADIOLOGY

## 2024-03-18 PROCEDURE — 77336 RADIATION PHYSICS CONSULT: CPT | Performed by: RADIOLOGY

## 2024-03-19 ENCOUNTER — APPOINTMENT (OUTPATIENT)
Dept: RADIATION ONCOLOGY | Age: 49
End: 2024-03-19
Payer: COMMERCIAL

## 2024-03-20 ENCOUNTER — HOSPITAL ENCOUNTER (OUTPATIENT)
Dept: RADIATION ONCOLOGY | Age: 49
Discharge: HOME | End: 2024-03-20
Payer: COMMERCIAL

## 2024-03-20 PROCEDURE — 77385 HC NTSTY MODUL RAD TX DLVR SMPL: CPT | Performed by: RADIOLOGY

## 2024-03-20 PROCEDURE — 77334 RADIATION TREATMENT AID(S): CPT | Performed by: RADIOLOGY

## 2024-03-21 ENCOUNTER — HOSPITAL ENCOUNTER (OUTPATIENT)
Dept: RADIATION ONCOLOGY | Age: 49
Discharge: HOME | End: 2024-03-21
Payer: COMMERCIAL

## 2024-03-21 PROCEDURE — 77385 HC NTSTY MODUL RAD TX DLVR SMPL: CPT | Performed by: RADIOLOGY

## 2024-03-22 ENCOUNTER — HOSPITAL ENCOUNTER (OUTPATIENT)
Dept: RADIATION ONCOLOGY | Age: 49
Discharge: HOME | End: 2024-03-22
Payer: COMMERCIAL

## 2024-03-22 VITALS
DIASTOLIC BLOOD PRESSURE: 85 MMHG | SYSTOLIC BLOOD PRESSURE: 123 MMHG | OXYGEN SATURATION: 96 % | RESPIRATION RATE: 16 BRPM | WEIGHT: 196.87 LBS | HEART RATE: 72 BPM | BODY MASS INDEX: 29.93 KG/M2 | TEMPERATURE: 97.3 F

## 2024-03-22 PROCEDURE — 77427 RADIATION TX MANAGEMENT X5: CPT | Performed by: RADIOLOGY

## 2024-03-22 PROCEDURE — 77385 HC NTSTY MODUL RAD TX DLVR SMPL: CPT | Performed by: RADIOLOGY

## 2024-03-22 PROCEDURE — 77321 SPECIAL TELETX PORT PLAN: CPT | Performed by: RADIOLOGY

## 2024-03-25 ENCOUNTER — HOSPITAL ENCOUNTER (OUTPATIENT)
Dept: RADIATION ONCOLOGY | Age: 49
Discharge: HOME | End: 2024-03-25
Payer: COMMERCIAL

## 2024-03-25 PROCEDURE — 77385 HC NTSTY MODUL RAD TX DLVR SMPL: CPT | Performed by: RADIOLOGY

## 2024-03-25 PROCEDURE — 77014 CHG CT GUIDANCE RADIATION THERAPY FLDS PLACEMENT: CPT | Performed by: RADIOLOGY

## 2024-03-26 ENCOUNTER — HOSPITAL ENCOUNTER (OUTPATIENT)
Dept: RADIATION ONCOLOGY | Age: 49
Discharge: HOME | End: 2024-03-26
Payer: COMMERCIAL

## 2024-03-26 VITALS
HEART RATE: 84 BPM | DIASTOLIC BLOOD PRESSURE: 72 MMHG | SYSTOLIC BLOOD PRESSURE: 133 MMHG | TEMPERATURE: 98 F | OXYGEN SATURATION: 97 % | WEIGHT: 199.3 LBS | RESPIRATION RATE: 18 BRPM | BODY MASS INDEX: 30.3 KG/M2

## 2024-03-26 PROCEDURE — 77385 HC NTSTY MODUL RAD TX DLVR SMPL: CPT | Performed by: RADIOLOGY

## 2024-03-26 PROCEDURE — 77014 CHG CT GUIDANCE RADIATION THERAPY FLDS PLACEMENT: CPT | Performed by: RADIOLOGY

## 2024-03-26 ASSESSMENT — PAIN SCALES - GENERAL: PAINLEVEL_OUTOF10: 7

## 2024-03-26 ASSESSMENT — PAIN DESCRIPTION - ORIENTATION: ORIENTATION: LEFT

## 2024-03-27 ENCOUNTER — HOSPITAL ENCOUNTER (OUTPATIENT)
Dept: RADIATION ONCOLOGY | Age: 49
Discharge: HOME | End: 2024-03-27
Payer: COMMERCIAL

## 2024-03-27 PROCEDURE — 77280 THER RAD SIMULAJ FIELD SMPL: CPT | Performed by: RADIOLOGY

## 2024-03-27 PROCEDURE — 77412 RADIATION TX DELIVERY LVL 3: CPT | Performed by: RADIOLOGY

## 2024-03-28 ENCOUNTER — HOSPITAL ENCOUNTER (OUTPATIENT)
Dept: RADIATION ONCOLOGY | Age: 49
Discharge: HOME | End: 2024-03-28
Payer: COMMERCIAL

## 2024-03-28 PROCEDURE — 77427 RADIATION TX MANAGEMENT X5: CPT | Performed by: RADIOLOGY

## 2024-03-28 PROCEDURE — 77412 RADIATION TX DELIVERY LVL 3: CPT | Performed by: RADIOLOGY

## 2024-03-29 ENCOUNTER — HOSPITAL ENCOUNTER (OUTPATIENT)
Dept: RADIATION ONCOLOGY | Age: 49
Discharge: HOME | End: 2024-03-29
Payer: COMMERCIAL

## 2024-03-29 PROCEDURE — 77412 RADIATION TX DELIVERY LVL 3: CPT | Performed by: RADIOLOGY

## 2024-04-01 ENCOUNTER — HOSPITAL ENCOUNTER (OUTPATIENT)
Dept: RADIATION ONCOLOGY | Age: 49
Discharge: HOME | End: 2024-04-01
Payer: COMMERCIAL

## 2024-04-01 PROCEDURE — 77412 RADIATION TX DELIVERY LVL 3: CPT | Performed by: RADIOLOGY

## 2024-04-01 PROCEDURE — 77336 RADIATION PHYSICS CONSULT: CPT | Performed by: RADIOLOGY

## 2024-04-03 NOTE — PROGRESS NOTES
[facility]           [address], [address]        [place], [place] [address]        O: [phone]        F: [phone]       [employer]     END OF TREATMENT SUMMARY    Date of Service: 2024  Patient ID: Tess Marley   : 1975  MRN: [id number]   Acct Number: [id number]           DIAGNOSIS:    Cancer Staging   Breast cancer metastasized to axillary lymph node, left (HCC)  Staging form: Breast, AJCC 8th Edition  - Clinical stage from 2023: Stage IB (cT1c, cN1, cM0, G1, ER+, TX+, HER2-) - Signed by Viraj Viera MD on 2024  - Pathologic stage from 2023: No Stage Recommended (ypT1a, pN1a, cM0, GX, ER+, TX+, HER2-) - Signed by Viraj Viera MD on 2024      HISTORY OF PRESENT ILLNESS:  Oncology History Overview Note   HISTORY    Initial history 23: According to [facility] Surgical Oncology note,    \"48 y.o. female  presents with palpable mass left. She had not had imaging in some time. On mammogram she was found to have calcifications adjacent to the area of palpable concern. She had followup imaging and biopsy which revealed  IDC Gr1 ER+/TX+/Her2- and DCIS`. She then had MRI which noted enhancement at the biopsy site and abnormal L axillary LN. US was performed of the axilla and biopsy of 2 LN was performed. 1/2 revealed metastatic disease.   FamHx: M cervical cancer, PAunt ? Pancreas cancer, P uncle kidney, PGM breast cacner 50's. MGM lymphoma at 46y     Breast:The breasts appear symmetrical. The left breast at 1:00 there is slightly thickened tissue at site of palpable abnormality which is c/w island of denser breast tissue, it does not have findings spiculated mass. There is biopsy scar at 12:00 in upper breast.  The skin, nipples and areolas appear normal. There is no skin dimpling with movement of the pectoralis. There is no nipple retraction. No nipple discharge can be elicited. The parenchyma is

## 2024-04-22 ENCOUNTER — APPOINTMENT (OUTPATIENT)
Dept: GENERAL RADIOLOGY | Age: 49
End: 2024-04-22
Payer: COMMERCIAL

## 2024-04-22 ENCOUNTER — HOSPITAL ENCOUNTER (EMERGENCY)
Age: 49
Discharge: HOME | End: 2024-04-22
Attending: EMERGENCY MEDICINE
Payer: COMMERCIAL

## 2024-04-22 VITALS
BODY MASS INDEX: 30.26 KG/M2 | OXYGEN SATURATION: 97 % | TEMPERATURE: 97.9 F | SYSTOLIC BLOOD PRESSURE: 161 MMHG | RESPIRATION RATE: 18 BRPM | HEART RATE: 72 BPM | DIASTOLIC BLOOD PRESSURE: 102 MMHG | WEIGHT: 199 LBS

## 2024-04-22 DIAGNOSIS — M25.562 ACUTE PAIN OF LEFT KNEE: Primary | ICD-10-CM

## 2024-04-22 DIAGNOSIS — C77.3 BREAST CANCER METASTASIZED TO AXILLARY LYMPH NODE, LEFT (HCC): ICD-10-CM

## 2024-04-22 DIAGNOSIS — C50.912 BREAST CANCER METASTASIZED TO AXILLARY LYMPH NODE, LEFT (HCC): ICD-10-CM

## 2024-04-22 PROCEDURE — 73564 X-RAY EXAM KNEE 4 OR MORE: CPT

## 2024-04-22 PROCEDURE — 6370000000 HC RX 637 (ALT 250 FOR IP): Performed by: EMERGENCY MEDICINE

## 2024-04-22 PROCEDURE — 99283 EMERGENCY DEPT VISIT LOW MDM: CPT

## 2024-04-22 RX ORDER — HYDROCODONE BITARTRATE AND ACETAMINOPHEN 5; 325 MG/1; MG/1
1 TABLET ORAL EVERY 6 HOURS PRN
Qty: 10 TABLET | Refills: 0 | Status: SHIPPED | OUTPATIENT
Start: 2024-04-22 | End: 2024-04-27

## 2024-04-22 RX ORDER — HYDROCODONE BITARTRATE AND ACETAMINOPHEN 5; 325 MG/1; MG/1
1 TABLET ORAL ONCE
Status: COMPLETED | OUTPATIENT
Start: 2024-04-22 | End: 2024-04-22

## 2024-04-22 RX ORDER — NAPROXEN 375 MG/1
375 TABLET ORAL 2 TIMES DAILY WITH MEALS
Qty: 40 TABLET | Refills: 0 | Status: SHIPPED | OUTPATIENT
Start: 2024-04-22 | End: 2024-05-12

## 2024-04-22 RX ADMIN — HYDROCODONE BITARTRATE AND ACETAMINOPHEN 1 TABLET: 5; 325 TABLET ORAL at 08:00

## 2024-04-22 ASSESSMENT — PAIN DESCRIPTION - LOCATION: LOCATION: KNEE

## 2024-04-22 ASSESSMENT — PAIN - FUNCTIONAL ASSESSMENT: PAIN_FUNCTIONAL_ASSESSMENT: 0-10

## 2024-04-22 ASSESSMENT — PAIN SCALES - GENERAL: PAINLEVEL_OUTOF10: 8

## 2024-04-22 ASSESSMENT — PAIN DESCRIPTION - ORIENTATION: ORIENTATION: LEFT

## 2024-04-22 ASSESSMENT — PAIN DESCRIPTION - DESCRIPTORS: DESCRIPTORS: SHARP

## 2024-04-22 NOTE — ED NOTES
Patient to the ED with complaint of left knee pain. Patient states that she has been having worsening left knee pain for the past 5 days. Patient denies any injury. Patient is resting in chair with easy and unlabored respirations. No distress noted. Call light in reach. Patient denies further complaints or concerns.

## 2024-04-22 NOTE — ED PROVIDER NOTES
CONSULTANTS:  None    PROCEDURES:   None    CRITICAL CARE:   None    Vitals:    04/22/24 0747 04/22/24 0748 04/22/24 0751   BP:  (!) 161/102    Pulse: 72     Resp: 18     Temp:   97.9 °F (36.6 °C)   TempSrc:   Oral   SpO2: 97%     Weight: 90.3 kg (199 lb)         NUMBER AND COMPLEXITY OF PROBLEMS        Problem List This Visit: Left knee pain    Pertinent Comorbid Conditions:  See HPI, PMH and PSH    DATA REVIEWED(none if left blank)    Code Status:  Reviewed with patient and/or family as full code    External Documentation Reviewed: Relevant record in care everywhere is reviewed (If there is any).    Previous relevant patient encounter documents & history available on EMR was reviewed: Yes (If there is any)    See Formal Diagnostic Results above for the lab and radiology tests and orders.    Shared Decision-Making: ED workups, treatment plan and dispositions are discussed with the patient/family, questions answered     FINAL IMPRESSION AND DISPOSITION     1. Acute pain of left knee    2. Breast cancer metastasized to axillary lymph node, left (HCC)        DISPOSITION Decision To Discharge 04/22/2024 08:34:26 AM      OUTPATIENT FOLLOW UP THE PATIENT:  [facility]  [address]  [facility] [address]  In 1 week  ED discharge follow up      DISCHARGE MEDICATIONS:(None if blank)  New Prescriptions    HYDROCODONE-ACETAMINOPHEN (NORCO) 5-325 MG PER TABLET    Take 1 tablet by mouth every 6 hours as needed for Pain for up to 5 days. Intended supply: 3 days. Take lowest dose possible to manage pain Max Daily Amount: 4 tablets    NAPROXEN (NAPROSYN) 375 MG TABLET    Take 1 tablet by mouth 2 times daily (with meals) for 20 days       MD Abbie Martinez, MD Brian  04/22/24 4155

## 2024-04-30 ENCOUNTER — HOSPITAL ENCOUNTER (OUTPATIENT)
Dept: RADIATION ONCOLOGY | Age: 49
Discharge: HOME | End: 2024-04-30
Payer: COMMERCIAL

## 2024-04-30 VITALS
SYSTOLIC BLOOD PRESSURE: 136 MMHG | TEMPERATURE: 97.8 F | RESPIRATION RATE: 18 BRPM | OXYGEN SATURATION: 96 % | BODY MASS INDEX: 30.26 KG/M2 | HEART RATE: 74 BPM | DIASTOLIC BLOOD PRESSURE: 89 MMHG | WEIGHT: 199 LBS

## 2024-04-30 PROCEDURE — 99212 OFFICE O/P EST SF 10 MIN: CPT

## 2024-04-30 ASSESSMENT — ENCOUNTER SYMPTOMS
COUGH: 0
BACK PAIN: 0
NAUSEA: 0
ABDOMINAL PAIN: 0
RECTAL PAIN: 0
BLOOD IN STOOL: 0
SHORTNESS OF BREATH: 0
DIARRHEA: 0
TROUBLE SWALLOWING: 0

## 2024-04-30 ASSESSMENT — PAIN SCALES - GENERAL: PAINLEVEL_OUTOF10: 6

## 2024-04-30 ASSESSMENT — PAIN DESCRIPTION - LOCATION: LOCATION: BREAST

## 2024-04-30 ASSESSMENT — PAIN DESCRIPTION - ORIENTATION: ORIENTATION: LEFT

## 2024-04-30 NOTE — PROGRESS NOTES
Interpretation      HER2 Signals/Nucleus                       2.7      CEN17 Signals/Nucleus                       2.1      HER2/CEN17 Signal Ratio                   1.3                 Negative      Number of Observers                       1      Group                               5     [facility]      8/8/2023 Imaging    NM bone scan         8/8/2023 Imaging    CT chest w con         8/8/2023 Imaging    CT AP         8/17/2023 Imaging    MRI abdomen         12/15/2023 Imaging    NM bone scan         12/15/2023 Imaging    CT chest         12/15/2023 Imaging    CT AP         12/21/2023 Surgery    Left breast lumpectomy and axillary excision, Dr. Gonzales of [facility]           12/21/2023 -  Cancer Staged    Staging form: Breast, AJCC 8th Edition  - Pathologic stage from 12/21/2023: No Stage Recommended (ypT1a, pN1a, cM0, GX, ER+, KY+, HER2-)     1/16/2024 Surgery    Left Breast Re-excision, Dr. Gonzales of [facility]    Pathologic Diagnosis   A. Left breast, medial margin, excision:  Breast tissue and surgical margin, negative for carcinoma  Surgical site changes     B. Left breast, posterior margin, excision:   Breast tissue and surgical margin, negative for carcinoma  Surgical site changes     Comment: Immunohistochemical stain (block A1 and B1) for p63/AE13 highlight myoepithelial and epithelial cells, supporting the diagnosis.      2/1/2024 Initial Diagnosis    Breast cancer metastasized to axillary lymph node, left (HCC)     3/5/2024 - 4/1/2024 Radiation    Treatment Course Number: 1    Treatment Site (s) Modality Dose (cGy) Fractions Elapsed Days   Left Whole Breast, RNI+IMNs IMRT 4005 15 21   Left Breast, Lumpectomy Cavity Boost Electrons 1000 4 5   Cumulative Dose: 5005 cGy    Radiation therapy delivered under the care of Dr. Viraj Viera MD MS ([facility])           INTERVAL HISTORY:  Tess Marley is a 48 y.o. female is presenting today for regularly scheduled
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
## 2024-06-20 ENCOUNTER — OFFICE VISIT (OUTPATIENT)
Dept: CARDIOLOGY CLINIC | Age: 49
End: 2024-06-20
Payer: COMMERCIAL

## 2024-06-20 VITALS
BODY MASS INDEX: 29.8 KG/M2 | HEART RATE: 76 BPM | DIASTOLIC BLOOD PRESSURE: 69 MMHG | HEIGHT: 68 IN | SYSTOLIC BLOOD PRESSURE: 103 MMHG | WEIGHT: 196.6 LBS

## 2024-06-20 DIAGNOSIS — R07.9 CHEST PAIN IN ADULT: Primary | ICD-10-CM

## 2024-06-20 PROCEDURE — 99214 OFFICE O/P EST MOD 30 MIN: CPT | Performed by: STUDENT IN AN ORGANIZED HEALTH CARE EDUCATION/TRAINING PROGRAM

## 2024-06-20 RX ORDER — AMLODIPINE BESYLATE 5 MG/1
5 TABLET ORAL DAILY
COMMUNITY
Start: 2024-05-22 | End: 2024-06-20 | Stop reason: SDUPTHER

## 2024-06-20 RX ORDER — LETROZOLE 2.5 MG/1
2.5 TABLET, FILM COATED ORAL DAILY
COMMUNITY
Start: 2024-06-07

## 2024-06-20 RX ORDER — AMLODIPINE BESYLATE 5 MG/1
5 TABLET ORAL DAILY
Qty: 30 TABLET | Refills: 11 | Status: SHIPPED | OUTPATIENT
Start: 2024-06-20

## 2024-07-17 ENCOUNTER — HOSPITAL ENCOUNTER (OUTPATIENT)
Dept: OCCUPATIONAL THERAPY | Age: 49
Setting detail: THERAPIES SERIES
Discharge: HOME | End: 2024-07-17
Payer: COMMERCIAL

## 2024-07-17 PROCEDURE — 97110 THERAPEUTIC EXERCISES: CPT

## 2024-07-17 PROCEDURE — 97166 OT EVAL MOD COMPLEX 45 MIN: CPT

## 2024-07-17 NOTE — PROGRESS NOTES
Activities Time    Notes/Comments                       Specific Interventions Next Treatment: lymphedema management education, compression garment education/recommendation, AROM, PROM, strengthening,     Activity/Treatment Tolerance:  []  Patient tolerated treatment well  []  Patient limited by fatigue  [x]  Patient limited by pain   []  Patient limited by other medical complications  []  Other:     Assessment: Patient meets criteria of moderate complexity evaluation based on documented evaluation findings. Patient presents with decreased AROM and PROM of left shoulder along with decreased strength. Patient also presents with high level of pain in left shoulder both at rest and with motion. Patient is limited with overhead activities due to both the limited motion and pain. Patient also has difficulty sleeping due to left shoulder pain. Skilled OT services are required to address ROM, pain management, and lymphedema education to allow patient to return to her normal activities.   Areas for Improvement: edema, impaired ROM, impaired sensation, impaired strength, pain, and impaired ADL  Prognosis: good    GOALS:  Patient Goal: To minimize the pain and increase the motion of left shoulder.     Short Term Goals:  Time Frame: 4 weeks  Be independent with HEP as instructed to increase her ability to complete overhead dressing tasks.  Increase active left shoulder flexion to 140, abduction to 100, and ER at side to 70 to increase her ability to reach into upper cupboard.  Increase passive left shoulder flexion to 165, abduction to 125, and ER at 90 to 40 to increase her flexibility to allow patient to retrieve lightweight item in overhead cupboard.  Report pain going no higher than 4/10 at any time in left UE to assist with sleep routine.    Long Term Goals:  Time Frame: 12 weeks  Report being able to sleep through the night without waking due to left shoulder or arm pain.  Verbalize understanding of basic lymphedema

## 2024-07-23 ENCOUNTER — HOSPITAL ENCOUNTER (OUTPATIENT)
Dept: OCCUPATIONAL THERAPY | Age: 49
Setting detail: THERAPIES SERIES
Discharge: HOME | End: 2024-07-23
Payer: COMMERCIAL

## 2024-07-23 PROCEDURE — 97535 SELF CARE MNGMENT TRAINING: CPT

## 2024-07-23 PROCEDURE — 97110 THERAPEUTIC EXERCISES: CPT

## 2024-07-23 NOTE — PROGRESS NOTES
[facility]  OCCUPATIONAL THERAPY  [] EVALUATION  [x] DAILY NOTE (LAND) [] DAILY NOTE (AQUATIC ) [] PROGRESS NOTE [] DISCHARGE NOTE    [x] OUTPATIENT REHABILITATION CENTER [facility]   [] [facility]    [] [facility]   [] [facility]    Date: 2024  Patient Name:  Tess Marley  : 1975  MRN: [id number]  CSN: [id number]    Referring Practitioner Yumi Varela PA-C      Diagnosis Lymphedema, not elsewhere classified  Pain in left arm  Pain in left shoulder  Stiffness of left shoulder, not elsewhere classified  Other specified personal risk factors, not elsewhere classified    Treatment Diagnosis M79.602  Left Arm Pain  M25.512  Left Shoulder Pain  M25.612 Stiffness of Left Shoulder and I89.0 Lymphedema, not elsewhere classified   Date of Evaluation 24    Additional Pertinent History Tess Marley has a past medical history of Anxiety, Cancer (HCC), and Migraine.      Functional Outcome Measure Used SPADI   Functional Outcome Score  (24)       Insurance: Primary: Payor: BCBS /  /  / ,   Secondary:    Authorization Information: PRE CERTIFICATION REQUIRED: No precert required.  INSURANCE THERAPY BENEFIT: Allowed 60 visits of OT, PT, and ST COMBINED per calendar year.  0 visits have been used.. Hard Max..   AQUATIC THERAPY COVERED: Yes  MODALITIES COVERED:  Yes  TELEHEALTH COVERED:    REFERENCE NUMBER: [id number]    Approved Procedure Codes: Authorization of Specific CPT Codes Not Required  (Codes requested indicated by red font, codes approved indicated by black font)   Visit # 2, 2/10 for progress note (Reporting Period: 24 to     )          Visits Allowed: 60   Recertification Date: 10/2/24   Physician Follow-Up: 24   Physician Orders: Evaluate for strengthening; at risk for lymphedema   History of Present Illness: Patient was diagnosed with left breast cancer in 2023. Patient started chemo in  and completed that

## 2024-07-25 ENCOUNTER — HOSPITAL ENCOUNTER (OUTPATIENT)
Dept: OCCUPATIONAL THERAPY | Age: 49
Setting detail: THERAPIES SERIES
Discharge: HOME | End: 2024-07-25
Payer: COMMERCIAL

## 2024-07-25 PROCEDURE — 97110 THERAPEUTIC EXERCISES: CPT

## 2024-07-25 NOTE — PROGRESS NOTES
[facility]  OCCUPATIONAL THERAPY  [] EVALUATION  [x] DAILY NOTE (LAND) [] DAILY NOTE (AQUATIC ) [] PROGRESS NOTE [] DISCHARGE NOTE    [x] OUTPATIENT REHABILITATION CENTER [facility]   [] [place] [facility]    [] [facility]   [] [facility]    Date: 2024  Patient Name:  Tess Marley  : 1975  MRN: [id number]  CSN: [id number]    Referring Practitioner Yumi Varela PA-C      Diagnosis Lymphedema, not elsewhere classified  Pain in left arm  Pain in left shoulder  Stiffness of left shoulder, not elsewhere classified  Other specified personal risk factors, not elsewhere classified    Treatment Diagnosis M79.602  Left Arm Pain  M25.512  Left Shoulder Pain  M25.612 Stiffness of Left Shoulder and I89.0 Lymphedema, not elsewhere classified   Date of Evaluation 24    Additional Pertinent History Tess Marley has a past medical history of Anxiety, Cancer (HCC), and Migraine.      Functional Outcome Measure Used SPADI   Functional Outcome Score  (24)       Insurance: Primary: Payor: BCBS /  /  / ,   Secondary:    Authorization Information: PRE CERTIFICATION REQUIRED: No precert required.  INSURANCE THERAPY BENEFIT: Allowed 60 visits of OT, PT, and ST COMBINED per calendar year.  0 visits have been used.. Hard Max..   AQUATIC THERAPY COVERED: Yes  MODALITIES COVERED:  Yes  TELEHEALTH COVERED:    REFERENCE NUMBER: [id number]    Approved Procedure Codes: Authorization of Specific CPT Codes Not Required  (Codes requested indicated by red font, codes approved indicated by black font)   Visit # 3, 3/10 for progress note (Reporting Period: 24 to     )          Visits Allowed: 60   Recertification Date: 10/2/24   Physician Follow-Up: 24   Physician Orders: Evaluate for strengthening; at risk for lymphedema   History of Present Illness: Patient was diagnosed with left breast cancer in 2023. Patient started chemo in  and completed that

## 2024-07-30 ENCOUNTER — HOSPITAL ENCOUNTER (OUTPATIENT)
Dept: OCCUPATIONAL THERAPY | Age: 49
Setting detail: THERAPIES SERIES
End: 2024-07-30
Payer: COMMERCIAL

## 2024-07-31 ENCOUNTER — HOSPITAL ENCOUNTER (EMERGENCY)
Age: 49
Discharge: HOME | End: 2024-07-31
Payer: COMMERCIAL

## 2024-07-31 VITALS
HEART RATE: 63 BPM | OXYGEN SATURATION: 98 % | SYSTOLIC BLOOD PRESSURE: 148 MMHG | RESPIRATION RATE: 16 BRPM | TEMPERATURE: 98.4 F | DIASTOLIC BLOOD PRESSURE: 87 MMHG

## 2024-07-31 DIAGNOSIS — L25.9 CONTACT DERMATITIS, UNSPECIFIED CONTACT DERMATITIS TYPE, UNSPECIFIED TRIGGER: Primary | ICD-10-CM

## 2024-07-31 PROCEDURE — 99213 OFFICE O/P EST LOW 20 MIN: CPT

## 2024-07-31 RX ORDER — MELOXICAM 15 MG/1
TABLET ORAL
COMMUNITY
Start: 2024-05-10

## 2024-07-31 RX ORDER — TRIAMCINOLONE ACETONIDE 0.25 MG/G
OINTMENT TOPICAL
Qty: 15 G | Refills: 1 | Status: SHIPPED | OUTPATIENT
Start: 2024-07-31 | End: 2024-08-07

## 2024-07-31 RX ORDER — SUMATRIPTAN 100 MG/1
100 TABLET, FILM COATED ORAL
COMMUNITY
Start: 2022-08-10

## 2024-07-31 RX ORDER — ANASTROZOLE 1 MG/1
1 TABLET ORAL DAILY
COMMUNITY
Start: 2024-04-28

## 2024-07-31 RX ORDER — BUTALBITAL, ACETAMINOPHEN AND CAFFEINE 300; 40; 50 MG/1; MG/1; MG/1
CAPSULE ORAL
COMMUNITY
Start: 2022-08-10

## 2024-07-31 RX ORDER — ERGOCALCIFEROL 1.25 MG/1
50000 CAPSULE ORAL WEEKLY
COMMUNITY
Start: 2024-04-29

## 2024-07-31 ASSESSMENT — ENCOUNTER SYMPTOMS
ALLERGIC/IMMUNOLOGIC NEGATIVE: 1
RESPIRATORY NEGATIVE: 1
GASTROINTESTINAL NEGATIVE: 1
EYES NEGATIVE: 1

## 2024-07-31 ASSESSMENT — PAIN - FUNCTIONAL ASSESSMENT: PAIN_FUNCTIONAL_ASSESSMENT: NONE - DENIES PAIN

## 2024-07-31 NOTE — ED PROVIDER NOTES
[facility] URGENT CARE  Urgent Care Encounter       CHIEF COMPLAINT       Chief Complaint   Patient presents with    Rash       Nurses Notes reviewed and I agree except as noted in the HPI.  HISTORY OF PRESENT ILLNESS   Tess Marley is a 49 y.o. female who presents to urgent care for rash posterior right knee.  Symptoms started 3 days ago.  Has tried over the counter triple antibiotic ointment with no relief.  State unknown how the rash started or what she came into contact with. State just finished her chemo in April and is worried the rash would spread.    The history is provided by the patient. No  was used.       REVIEW OF SYSTEMS     Review of Systems   Constitutional: Negative.    HENT: Negative.     Eyes: Negative.    Respiratory: Negative.     Cardiovascular: Negative.    Gastrointestinal: Negative.    Endocrine: Negative.    Genitourinary: Negative.    Musculoskeletal: Negative.    Skin:  Positive for rash (posterior right knee).   Allergic/Immunologic: Negative.    Neurological: Negative.    Hematological: Negative.    Psychiatric/Behavioral: Negative.         PAST MEDICAL HISTORY         Diagnosis Date    Anxiety     Cancer (HCC)     Migraine        SURGICALHISTORY     Patient  has a past surgical history that includes Tonsillectomy; Tubal ligation; Breast surgery; and Endometrial ablation (2019).    CURRENT MEDICATIONS       Discharge Medication List as of 7/31/2024  9:24 AM        CONTINUE these medications which have NOT CHANGED    Details   anastrozole (ARIMIDEX) 1 MG tablet Take 1 tablet by mouth dailyHistorical Med      SUMAtriptan (IMITREX) 100 MG tablet Take 1 tablet by mouthHistorical Med      meloxicam (MOBIC) 15 MG tablet take 1 tablet by mouth once daily with food or milkHistorical Med      ergocalciferol (ERGOCALCIFEROL) 1.25 MG (73243 UT) capsule Take 1 capsule by mouth once a weekHistorical Med      butalbital-APAP-caffeine -40 MG CAPS per  capsule Take by mouthHistorical Med      Abemaciclib 150 MG TABS Take 150 mg by mouth 2 times dailyHistorical Med      letrozole (FEMARA) 2.5 MG tablet Take 1 tablet by mouth dailyHistorical Med      amLODIPine (NORVASC) 5 MG tablet Take 1 tablet by mouth daily, Disp-30 tablet, R-11Normal      naproxen (NAPROSYN) 375 MG tablet Take 1 tablet by mouth 2 times daily (with meals) for 20 days, Disp-40 tablet, R-0Normal      pantoprazole (PROTONIX) 40 MG tablet Take 1 tablet by mouth daily, Disp-30 tablet, R-5Normal             ALLERGIES     Patient is has No Known Allergies.    Patients   There is no immunization history on file for this patient.    FAMILY HISTORY     Patient's family history includes Breast Cancer in her paternal grandmother; Cancer in her maternal grandmother, mother, paternal aunt, and paternal uncle.    SOCIAL HISTORY     Patient  reports that she has never smoked. She has never used smokeless tobacco. She reports that she does not currently use alcohol. She reports that she does not use drugs.    PHYSICAL EXAM     ED TRIAGE VITALS  BP: (!) 148/87, Temp: 98.4 °F (36.9 °C), Pulse: 63, Respirations: 16, SpO2: 98 %,Estimated body mass index is 29.89 kg/m² as calculated from the following:    Height as of 6/20/24: 1.727 m (5' 8\").    Weight as of 6/20/24: 89.2 kg (196 lb 9.6 oz).,No LMP recorded. Patient has had an ablation.    Physical Exam  Constitutional:       General: She is not in acute distress.     Appearance: Normal appearance. She is normal weight.   HENT:      Head: Normocephalic and atraumatic.      Nose: Nose normal.      Mouth/Throat:      Pharynx: Oropharynx is clear.   Eyes:      Conjunctiva/sclera: Conjunctivae normal.   Cardiovascular:      Rate and Rhythm: Normal rate and regular rhythm.      Heart sounds: Normal heart sounds.   Pulmonary:      Effort: Pulmonary effort is normal.      Breath sounds: Normal breath sounds.   Skin:     General: Skin is warm and dry.      Capillary Refill:

## 2024-07-31 NOTE — DISCHARGE INSTRUCTIONS
Medication as prescribed.  Keep the area clean and dry use antibacterial soap and water.  Follow-up with family doctor in 3-5 days.  Return for new or worsening symptoms.

## 2024-08-06 ENCOUNTER — HOSPITAL ENCOUNTER (OUTPATIENT)
Dept: OCCUPATIONAL THERAPY | Age: 49
Setting detail: THERAPIES SERIES
Discharge: HOME | End: 2024-08-06
Payer: COMMERCIAL

## 2024-08-06 PROCEDURE — 97110 THERAPEUTIC EXERCISES: CPT

## 2024-08-06 NOTE — PROGRESS NOTES
[facility]  OCCUPATIONAL THERAPY  [] EVALUATION  [x] DAILY NOTE (LAND) [] DAILY NOTE (AQUATIC ) [] PROGRESS NOTE [] DISCHARGE NOTE    [x] OUTPATIENT REHABILITATION CENTER [facility]   [] [place] [facility]    [] [facility]   [] [facility]    Date: 2024  Patient Name:  Tess Marley  : 1975  MRN: [id number]  CSN: [id number]    Referring Practitioner Yumi Varela PA-C      Diagnosis Lymphedema, not elsewhere classified  Pain in left arm  Pain in left shoulder  Stiffness of left shoulder, not elsewhere classified  Other specified personal risk factors, not elsewhere classified    Treatment Diagnosis M79.602  Left Arm Pain  M25.512  Left Shoulder Pain  M25.612 Stiffness of Left Shoulder and I89.0 Lymphedema, not elsewhere classified   Date of Evaluation 24    Additional Pertinent History Tess Marley has a past medical history of Anxiety, Cancer (HCC), and Migraine.      Functional Outcome Measure Used SPADI   Functional Outcome Score  (24)       Insurance: Primary: Payor: BCBS /  /  / ,   Secondary:    Authorization Information: PRE CERTIFICATION REQUIRED: No precert required.  INSURANCE THERAPY BENEFIT: Allowed 60 visits of OT, PT, and ST COMBINED per calendar year.  0 visits have been used.. Hard Max..   AQUATIC THERAPY COVERED: Yes  MODALITIES COVERED:  Yes  TELEHEALTH COVERED:    REFERENCE NUMBER: [id number]    Approved Procedure Codes: Authorization of Specific CPT Codes Not Required  (Codes requested indicated by red font, codes approved indicated by black font)   Visit # 4, 4/10 for progress note (Reporting Period: 24 to     )          Visits Allowed: 60   Recertification Date: 10/2/24   Physician Follow-Up: 24   Physician Orders: Evaluate for strengthening; at risk for lymphedema   History of Present Illness: Patient was diagnosed with left breast cancer in 2023. Patient started chemo in  and completed that

## 2024-08-07 RX ORDER — AMLODIPINE BESYLATE 5 MG/1
5 TABLET ORAL DAILY
Qty: 90 TABLET | Refills: 3 | Status: SHIPPED | OUTPATIENT
Start: 2024-08-07

## 2024-08-07 NOTE — TELEPHONE ENCOUNTER
Patient is calling about this prescription. It was supposed to be transferred to [facility] when they closed the [facility]. However, [facility] is telling her that they do not have it. Can a new script be sent to [facility].    If possible, can this be written as a 90 day supply with refills?    Tess C Johnson called requesting a refill on the following medications:  Requested Prescriptions     Pending Prescriptions Disp Refills    amLODIPine (NORVASC) 5 MG tablet 30 tablet 11     Sig: Take 1 tablet by mouth daily     Pharmacy verified:  .[id number] - [place], [place] - [address] - P [phone] - F [phone]     Date of last visit: 06/20/2024  Date of next visit (if applicable): 06/19/2025

## 2024-08-08 ENCOUNTER — HOSPITAL ENCOUNTER (OUTPATIENT)
Dept: OCCUPATIONAL THERAPY | Age: 49
Setting detail: THERAPIES SERIES
Discharge: HOME | End: 2024-08-08
Payer: COMMERCIAL

## 2024-08-08 PROCEDURE — 97110 THERAPEUTIC EXERCISES: CPT

## 2024-08-08 NOTE — PROGRESS NOTES
in 11/23. Had surgery in Dec 23 to remove tumor in left breast along with all lymph nodes under left arm. Patient returned for surgery in January 24 due to not having clean margins from previous surgery. Patient had radiation in March and April 2024. Patient also had left lymphovenous surgery at the time of the December 2023 surgery. Patient relates that she started to have more intense left shoulder pain when she returned to work in May 2024. Patient currently getting monthly injections at [facility] to inhibit production of estrogen. Patient also takes 2 hormone pills for this issue.      SUBJECTIVE: Patient states that her shoulder is \"not too bad.\"     OBJECTIVE:      TREATMENT   Precautions: Left breast cancer; left lymphovenous surgery 12/23   Pain:  No pain at time of therapy LOCATION: under left arm    “X” in shaded column indicates Activity Completed Today   Modalities Parameters/  Location  Notes/Comments                     Manual Therapy Time/  Technique  Notes/Comments   Left scapular mobilizations while in sidelying                  Exercises   Sets/  Sec Reps  Notes/Comments   Supine dowel for shoulder flexion and chest press using 2# dowel 1 10 each x    Bilateral table slides for shoulder flexion 1 10     Table slides for left shoulder abduction 1 10     Scapular retraction 1 10     Backward shoulder circles 1 10     Posterior capsule stretch for left UE 1 10  5 second hold    [place] circles with left UE 1 10 each direction x    Pulleys for shoulder flexion 1 20 x    Supine PREs with 1# in left UE - shoulder flexion, chest press, ceiling circles, horizontal abduction/adduction 1 10 each x    Supine PROM to left shoulder in all planes to patient tolerance   x    ER stretch behind head while supine 1 10 x 10 second hold   Active motion of left shoulder    Flexion = 141, onsujyigs=548, ER at side = 75   Left shoulder ER stretch at doorway (in 90/90 position) 1 10  10 second hold   Wall walk on climbing

## 2024-08-14 ENCOUNTER — HOSPITAL ENCOUNTER (OUTPATIENT)
Dept: OCCUPATIONAL THERAPY | Age: 49
Setting detail: THERAPIES SERIES
Discharge: HOME | End: 2024-08-14
Payer: COMMERCIAL

## 2024-08-14 PROCEDURE — 97110 THERAPEUTIC EXERCISES: CPT

## 2024-08-14 NOTE — PROGRESS NOTES
[facility]  OCCUPATIONAL THERAPY  [] EVALUATION  [x] DAILY NOTE (LAND) [] DAILY NOTE (AQUATIC ) [] PROGRESS NOTE [] DISCHARGE NOTE    [x] OUTPATIENT REHABILITATION CENTER [facility]   [] [place] [facility]    [] [facility]   [] [facility]    Date: 2024  Patient Name:  Tess Marley  : 1975  MRN: [id number]  CSN: [id number]    Referring Practitioner Yumi Varela PA-C      Diagnosis Lymphedema, not elsewhere classified  Pain in left arm  Pain in left shoulder  Stiffness of left shoulder, not elsewhere classified  Other specified personal risk factors, not elsewhere classified    Treatment Diagnosis M79.602  Left Arm Pain  M25.512  Left Shoulder Pain  M25.612 Stiffness of Left Shoulder and I89.0 Lymphedema, not elsewhere classified   Date of Evaluation 24    Additional Pertinent History Tess Marley has a past medical history of Anxiety, Cancer (HCC), and Migraine.      Functional Outcome Measure Used SPADI   Functional Outcome Score  (24)       Insurance: Primary: Payor: BCBS /  /  / ,   Secondary:    Authorization Information: PRE CERTIFICATION REQUIRED: No precert required.  INSURANCE THERAPY BENEFIT: Allowed 60 visits of OT, PT, and ST COMBINED per calendar year.  0 visits have been used.. Hard Max..   AQUATIC THERAPY COVERED: Yes  MODALITIES COVERED:  Yes  TELEHEALTH COVERED:    REFERENCE NUMBER: [id number]    Approved Procedure Codes: Authorization of Specific CPT Codes Not Required  (Codes requested indicated by red font, codes approved indicated by black font)   Visit # 6, 6/10 for progress note (Reporting Period: 24 to     )          Visits Allowed: 60   Recertification Date: 10/2/24   Physician Follow-Up: No follow up with referring provider   Physician Orders: Evaluate for strengthening; at risk for lymphedema   History of Present Illness: Patient was diagnosed with left breast cancer in 2023. Patient started

## 2024-08-15 ENCOUNTER — APPOINTMENT (OUTPATIENT)
Dept: OCCUPATIONAL THERAPY | Age: 49
End: 2024-08-15
Payer: COMMERCIAL

## 2024-08-20 ENCOUNTER — HOSPITAL ENCOUNTER (OUTPATIENT)
Dept: OCCUPATIONAL THERAPY | Age: 49
Setting detail: THERAPIES SERIES
Discharge: HOME | End: 2024-08-20
Payer: COMMERCIAL

## 2024-08-20 PROCEDURE — 97110 THERAPEUTIC EXERCISES: CPT

## 2024-08-20 NOTE — PROGRESS NOTES
** PLEASE SIGN, DATE AND TIME CERTIFICATION BELOW AND RETURN TO [facility] [place] OUTPATIENT REHABILITATION (FAX #: [phone]).  ATTEST/CO-SIGN IF ACCESSING VIA INService Route.  THANK YOU.**    I certify that I have examined the patient below and determined that Physical Medicine and Rehabilitation service is necessary and that I approve the established plan of care for up to 90 days or as specifically noted.  Attestation, signature or co-signature of physician indicates approval of certification requirements.    ________________________ ____________ __________  Physician Signature   Date   Time     [facility]  OCCUPATIONAL THERAPY  [] EVALUATION  [] DAILY NOTE (LAND) [] DAILY NOTE (AQUATIC ) [x] PROGRESS NOTE [] DISCHARGE NOTE    [x] OUTPATIENT REHABILITATION [facility]   [] [facility]    [] [facility]   [] [facility]    Date: 2024  Patient Name:  Tess Marley  : 1975  MRN: [id number]  CSN: [id number]    Referring Practitioner Yumi Varela PA-C      Diagnosis Lymphedema, not elsewhere classified  Pain in left arm  Pain in left shoulder  Stiffness of left shoulder, not elsewhere classified  Other specified personal risk factors, not elsewhere classified    Treatment Diagnosis M79.602  Left Arm Pain  M25.512  Left Shoulder Pain  M25.612 Stiffness of Left Shoulder and I89.0 Lymphedema, not elsewhere classified   Date of Evaluation 24    Additional Pertinent History Tess Marley has a past medical history of Anxiety, Cancer (HCC), and Migraine.      Functional Outcome Measure Used SPADI   Functional Outcome Score  60(24)  44 (24)      Insurance: Primary: Payor: BCBS /  /  / ,   Secondary:    Authorization Information: PRE CERTIFICATION REQUIRED: No precert required.  INSURANCE THERAPY BENEFIT: Allowed 60 visits of OT, PT, and ST COMBINED per calendar year.  0 visits have been used.. Hard Max..   AQUATIC THERAPY COVERED:

## 2024-08-22 ENCOUNTER — APPOINTMENT (OUTPATIENT)
Dept: OCCUPATIONAL THERAPY | Age: 49
End: 2024-08-22
Payer: COMMERCIAL

## 2024-08-27 ENCOUNTER — HOSPITAL ENCOUNTER (OUTPATIENT)
Dept: OCCUPATIONAL THERAPY | Age: 49
Setting detail: THERAPIES SERIES
Discharge: HOME | End: 2024-08-27
Payer: COMMERCIAL

## 2024-08-27 PROCEDURE — 97110 THERAPEUTIC EXERCISES: CPT

## 2024-08-27 NOTE — PROGRESS NOTES
[facility]  OCCUPATIONAL THERAPY  [] EVALUATION  [x] DAILY NOTE (LAND) [] DAILY NOTE (AQUATIC ) [] PROGRESS NOTE [] DISCHARGE NOTE    [x] OUTPATIENT REHABILITATION CENTER [facility]   [] [facility]    [] [facility]   [] [facility]    Date: 2024  Patient Name:  eTss Marley  : 1975  MRN: [id number]  CSN: [id number]    Referring Practitioner Yumi Varela PA-C      Diagnosis Lymphedema, not elsewhere classified  Pain in left arm  Pain in left shoulder  Stiffness of left shoulder, not elsewhere classified  Other specified personal risk factors, not elsewhere classified    Treatment Diagnosis M79.602  Left Arm Pain  M25.512  Left Shoulder Pain  M25.612 Stiffness of Left Shoulder and I89.0 Lymphedema, not elsewhere classified   Date of Evaluation 24    Additional Pertinent History Tess Marley has a past medical history of Anxiety, Cancer (HCC), and Migraine.      Functional Outcome Measure Used SPADI   Functional Outcome Score  60(24)  44 (24)      Insurance: Primary: Payor: BCBS /  /  / ,   Secondary:    Authorization Information: PRE CERTIFICATION REQUIRED: No precert required.  INSURANCE THERAPY BENEFIT: Allowed 60 visits of OT, PT, and ST COMBINED per calendar year.  0 visits have been used.. Hard Max..   AQUATIC THERAPY COVERED: Yes  MODALITIES COVERED:  Yes  TELEHEALTH COVERED:    REFERENCE NUMBER: [id number]    Approved Procedure Codes: Authorization of Specific CPT Codes Not Required  (Codes requested indicated by red font, codes approved indicated by black font)   Visit # 8, 1/10 for PN; PN completed on 24 (Reporting Period: 24 to   )          Visits Allowed: 60   Recertification Date: 10/2/24   Physician Follow-Up: No follow up with referring provider   Physician Orders: Evaluate for strengthening; at risk for lymphedema   History of Present Illness: Patient was diagnosed with left breast cancer in  to 150, and ER at 90 to 80 to increase her ability to reach overhead into upper cupboards.   Increase passive left shoulder flexion to 160, abduction to 160, and ER at 90 to 65 to increase flexibility to allow patient to eventually complete overhead activities  Report pain going no higher than 4/10 at any time in left UE to assist with sleep routine.   Long Term Goals:  Time Frame:8 weeks  Report being able to sleep through the night without waking due to left shoulder or arm pain.   Verbalize understanding of basic lymphedema management techniques including appropriate compression sleeve wear.   NO PREVIOUS GOAL  NO PREVIOUS GOAL   Increase MMT of left shoulder extension and adduction to 4/5 to increase her ability to complete work tasks.     Patient Education:   [x]  HEP/Education Completed: Plan of Care, Goals, HEP - scapular retraction, backward shoulder circles, supine dowel for shoulder flexion and chest press  7/23/24: healthy habits for lymphedema patients; importance of completing HEP  7/25/24: rainbow circles with left UE; information provided about specific types of compression sleeves; bilateral table slides for shoulder flexion, table slides for shoulder abduction: 90/90 ER stretch in doorway  8/6/24: bilateral wall slides for shoulder flexion  8/8/24: supine PREs with 1# - shoulder flexion, ceiling punch, ceiling circles, horizontal abduction/adduction; ER stretch behind head  8/20/24: goal status; recommendation of compression sleeve and gauntlet  8/27/24: purpose and care for rock tape; recommended to trial wearing shapewear garment for left axilla/lateral breast compressiong  []  No new Education completed  [x]  Reviewed Prior HEP      [x]  Patient verbalized and/or demonstrated understanding of education provided.  []  Patient unable to verbalize and/or demonstrate understanding of education provided.  Will continue education.  [x]  Barriers to learning: none    PLAN:      []  Plan of care

## 2024-08-28 ENCOUNTER — HOSPITAL ENCOUNTER (OUTPATIENT)
Dept: OCCUPATIONAL THERAPY | Age: 49
Setting detail: THERAPIES SERIES
Discharge: HOME | End: 2024-08-28
Payer: COMMERCIAL

## 2024-08-28 PROCEDURE — 97110 THERAPEUTIC EXERCISES: CPT

## 2024-08-28 NOTE — PROGRESS NOTES
[facility]  OCCUPATIONAL THERAPY  [] EVALUATION  [x] DAILY NOTE (LAND) [] DAILY NOTE (AQUATIC ) [] PROGRESS NOTE [] DISCHARGE NOTE    [x] OUTPATIENT REHABILITATION CENTER [facility]   [] [place] [facility]    [] [facility]   [] [facility]    Date: 2024  Patient Name:  Tess Marley  : 1975  MRN: [id number]  CSN: [id number]    Referring Practitioner Yumi Varela PA-C      Diagnosis Lymphedema, not elsewhere classified  Pain in left arm  Pain in left shoulder  Stiffness of left shoulder, not elsewhere classified  Other specified personal risk factors, not elsewhere classified    Treatment Diagnosis M79.602  Left Arm Pain  M25.512  Left Shoulder Pain  M25.612 Stiffness of Left Shoulder and I89.0 Lymphedema, not elsewhere classified   Date of Evaluation 24    Additional Pertinent History Tess Marley has a past medical history of Anxiety, Cancer (HCC), and Migraine.      Functional Outcome Measure Used SPADI   Functional Outcome Score  60(24)  44 (24)      Insurance: Primary: Payor: BCBS /  /  / ,   Secondary:    Authorization Information: PRE CERTIFICATION REQUIRED: No precert required.  INSURANCE THERAPY BENEFIT: Allowed 60 visits of OT, PT, and ST COMBINED per calendar year.  0 visits have been used.. Hard Max..   AQUATIC THERAPY COVERED: Yes  MODALITIES COVERED:  Yes  TELEHEALTH COVERED:    REFERENCE NUMBER: [id number]    Approved Procedure Codes: Authorization of Specific CPT Codes Not Required  (Codes requested indicated by red font, codes approved indicated by black font)   Visit # 9, 2/10 for PN; PN completed on 24 (Reporting Period: 24 to   )          Visits Allowed: 60   Recertification Date: 10/2/24   Physician Follow-Up: No follow up with referring provider   Physician Orders: Evaluate for strengthening; at risk for lymphedema   History of Present Illness: Patient was diagnosed with left breast cancer in

## 2024-09-03 ENCOUNTER — HOSPITAL ENCOUNTER (OUTPATIENT)
Dept: OCCUPATIONAL THERAPY | Age: 49
Setting detail: THERAPIES SERIES
Discharge: HOME | End: 2024-09-03
Payer: COMMERCIAL

## 2024-09-03 PROCEDURE — 97110 THERAPEUTIC EXERCISES: CPT

## 2024-09-03 NOTE — PROGRESS NOTES
[facility]  OCCUPATIONAL THERAPY  [] EVALUATION  [x] DAILY NOTE (LAND) [] DAILY NOTE (AQUATIC ) [] PROGRESS NOTE [] DISCHARGE NOTE    [x] OUTPATIENT REHABILITATION CENTER [facility]   [] [place] [facility]    [] [facility]   [] [facility]    Date: 9/3/2024  Patient Name:  Tess Marley  : 1975  MRN: [id number]  CSN: [id number]    Referring Practitioner Yumi Varela PA-C      Diagnosis Lymphedema, not elsewhere classified  Pain in left arm  Pain in left shoulder  Stiffness of left shoulder, not elsewhere classified  Other specified personal risk factors, not elsewhere classified    Treatment Diagnosis M79.602  Left Arm Pain  M25.512  Left Shoulder Pain  M25.612 Stiffness of Left Shoulder and I89.0 Lymphedema, not elsewhere classified   Date of Evaluation 24    Additional Pertinent History Tess Marley has a past medical history of Anxiety, Cancer (HCC), and Migraine.      Functional Outcome Measure Used SPADI   Functional Outcome Score  60(24)  44 (24)      Insurance: Primary: Payor: BCBS /  /  / ,   Secondary:    Authorization Information: PRE CERTIFICATION REQUIRED: No precert required.  INSURANCE THERAPY BENEFIT: Allowed 60 visits of OT, PT, and ST COMBINED per calendar year.  0 visits have been used.. Hard Max..   AQUATIC THERAPY COVERED: Yes  MODALITIES COVERED:  Yes  TELEHEALTH COVERED:    REFERENCE NUMBER: [id number]    Approved Procedure Codes: Authorization of Specific CPT Codes Not Required  (Codes requested indicated by red font, codes approved indicated by black font)   Visit # 10, 3/10 for PN; PN completed on 24 (Reporting Period: 24 to   )          Visits Allowed: 60   Recertification Date: 10/2/24   Physician Follow-Up: No follow up with referring provider   Physician Orders: Evaluate for strengthening; at risk for lymphedema   History of Present Illness: Patient was diagnosed with left breast cancer in

## 2024-09-05 ENCOUNTER — LAB (OUTPATIENT)
Dept: LAB | Age: 49
End: 2024-09-05

## 2024-09-05 ENCOUNTER — APPOINTMENT (OUTPATIENT)
Dept: OCCUPATIONAL THERAPY | Age: 49
End: 2024-09-05
Payer: COMMERCIAL

## 2024-09-10 ENCOUNTER — HOSPITAL ENCOUNTER (OUTPATIENT)
Dept: OCCUPATIONAL THERAPY | Age: 49
Setting detail: THERAPIES SERIES
Discharge: HOME | End: 2024-09-10
Payer: COMMERCIAL

## 2024-09-10 PROCEDURE — 97110 THERAPEUTIC EXERCISES: CPT

## 2024-09-12 ENCOUNTER — HOSPITAL ENCOUNTER (OUTPATIENT)
Dept: OCCUPATIONAL THERAPY | Age: 49
Setting detail: THERAPIES SERIES
Discharge: HOME | End: 2024-09-12
Payer: COMMERCIAL

## 2024-09-12 PROCEDURE — 97110 THERAPEUTIC EXERCISES: CPT

## 2024-09-16 LAB — CYTOLOGY THIN PREP PAP: NORMAL

## 2024-09-17 ENCOUNTER — HOSPITAL ENCOUNTER (OUTPATIENT)
Dept: OCCUPATIONAL THERAPY | Age: 49
Setting detail: THERAPIES SERIES
Discharge: HOME | End: 2024-09-17
Payer: COMMERCIAL

## 2024-09-17 PROCEDURE — 97110 THERAPEUTIC EXERCISES: CPT

## 2024-09-19 ENCOUNTER — APPOINTMENT (OUTPATIENT)
Dept: OCCUPATIONAL THERAPY | Age: 49
End: 2024-09-19
Payer: COMMERCIAL

## 2024-09-24 ENCOUNTER — APPOINTMENT (OUTPATIENT)
Dept: OCCUPATIONAL THERAPY | Age: 49
End: 2024-09-24
Payer: COMMERCIAL

## 2024-09-26 ENCOUNTER — APPOINTMENT (OUTPATIENT)
Dept: OCCUPATIONAL THERAPY | Age: 49
End: 2024-09-26
Payer: COMMERCIAL

## 2024-10-14 NOTE — PROGRESS NOTES
PAT call attempted, patient unavailable, left message to please call us back at your earliest convenience; [phone]

## 2024-10-16 ENCOUNTER — HOSPITAL ENCOUNTER (OUTPATIENT)
Age: 49
Discharge: HOME | End: 2024-10-16
Payer: COMMERCIAL

## 2024-10-16 LAB
AUTO DIFF PNL BLD: ABNORMAL
BASOPHILS ABSOLUTE: 0.1 THOU/MM3 (ref 0–0.1)
BASOPHILS NFR BLD AUTO: 2.4 %
DEPRECATED RDW RBC AUTO: 45.7 FL (ref 35–45)
EKG ATRIAL RATE: 63 BPM
EKG P AXIS: 72 DEGREES
EKG P-R INTERVAL: 130 MS
EKG Q-T INTERVAL: 464 MS
EKG QRS DURATION: 80 MS
EKG QTC CALCULATION (BAZETT): 474 MS
EKG R AXIS: 77 DEGREES
EKG T AXIS: 73 DEGREES
EKG VENTRICULAR RATE: 63 BPM
EOSINOPHIL NFR BLD AUTO: 3.6 %
EOSINOPHILS ABSOLUTE: 0.1 THOU/MM3 (ref 0–0.4)
ERYTHROCYTE [DISTWIDTH] IN BLOOD BY AUTOMATED COUNT: 12.8 % (ref 11.5–14.5)
HCT VFR BLD AUTO: 34.7 % (ref 37–47)
HGB BLD-MCNC: 12.2 GM/DL (ref 12–16)
IMM GRANULOCYTES # BLD AUTO: 0.02 THOU/MM3 (ref 0–0.07)
IMM GRANULOCYTES NFR BLD AUTO: 0.6 %
LYMPHOCYTES ABSOLUTE: 1.3 THOU/MM3 (ref 1–4.8)
LYMPHOCYTES NFR BLD AUTO: 40.5 %
MCH RBC QN AUTO: 34.9 PG (ref 26–33)
MCHC RBC AUTO-ENTMCNC: 35.2 GM/DL (ref 32.2–35.5)
MCV RBC AUTO: 99.1 FL (ref 81–99)
MONOCYTES ABSOLUTE: 0.4 THOU/MM3 (ref 0.4–1.3)
MONOCYTES NFR BLD AUTO: 11.4 %
NEUTROPHILS ABSOLUTE: 1.4 THOU/MM3 (ref 1.8–7.7)
NEUTROPHILS NFR BLD AUTO: 41.5 %
NRBC BLD AUTO-RTO: 0 /100 WBC
PATHOLOGIST REVIEW: ABNORMAL
PLATELET # BLD AUTO: 239 THOU/MM3 (ref 130–400)
PLATELET BLD QL SMEAR: ADEQUATE
PMV BLD AUTO: 9.3 FL (ref 9.4–12.4)
RBC # BLD AUTO: 3.5 MILL/MM3 (ref 4.2–5.4)
SCAN OF BLOOD SMEAR: NORMAL
WBC # BLD AUTO: 3.3 THOU/MM3 (ref 4.8–10.8)

## 2024-10-16 PROCEDURE — 85025 COMPLETE CBC W/AUTO DIFF WBC: CPT

## 2024-10-16 PROCEDURE — 36415 COLL VENOUS BLD VENIPUNCTURE: CPT

## 2024-10-16 PROCEDURE — 93005 ELECTROCARDIOGRAM TRACING: CPT | Performed by: OBSTETRICS & GYNECOLOGY

## 2024-10-17 ENCOUNTER — ANESTHESIA EVENT (OUTPATIENT)
Dept: OPERATING ROOM | Age: 49
End: 2024-10-17
Payer: COMMERCIAL

## 2024-10-17 ENCOUNTER — ANESTHESIA (OUTPATIENT)
Dept: OPERATING ROOM | Age: 49
End: 2024-10-17
Payer: COMMERCIAL

## 2024-10-17 ENCOUNTER — HOSPITAL ENCOUNTER (OUTPATIENT)
Age: 49
Setting detail: OUTPATIENT SURGERY
Discharge: HOME | End: 2024-10-17
Attending: OBSTETRICS & GYNECOLOGY | Admitting: OBSTETRICS & GYNECOLOGY
Payer: COMMERCIAL

## 2024-10-17 VITALS
SYSTOLIC BLOOD PRESSURE: 128 MMHG | HEIGHT: 67 IN | OXYGEN SATURATION: 99 % | BODY MASS INDEX: 30.86 KG/M2 | WEIGHT: 196.6 LBS | RESPIRATION RATE: 20 BRPM | HEART RATE: 74 BPM | TEMPERATURE: 96.4 F | DIASTOLIC BLOOD PRESSURE: 78 MMHG

## 2024-10-17 DIAGNOSIS — Z98.890 S/P LAPAROSCOPY: Primary | ICD-10-CM

## 2024-10-17 DIAGNOSIS — C50.919 MALIGNANT NEOPLASM OF FEMALE BREAST, UNSPECIFIED ESTROGEN RECEPTOR STATUS, UNSPECIFIED LATERALITY, UNSPECIFIED SITE OF BREAST (HCC): ICD-10-CM

## 2024-10-17 LAB
DEPRECATED RDW RBC AUTO: 46.9 FL (ref 35–45)
ERYTHROCYTE [DISTWIDTH] IN BLOOD BY AUTOMATED COUNT: 12.8 % (ref 11.5–14.5)
HCT VFR BLD AUTO: 35.1 % (ref 37–47)
HGB BLD-MCNC: 12.2 GM/DL (ref 12–16)
MCH RBC QN AUTO: 35.1 PG (ref 26–33)
MCHC RBC AUTO-ENTMCNC: 34.8 GM/DL (ref 32.2–35.5)
MCV RBC AUTO: 100.9 FL (ref 81–99)
PLATELET # BLD AUTO: 231 THOU/MM3 (ref 130–400)
PMV BLD AUTO: 9.2 FL (ref 9.4–12.4)
PREGNANCY, URINE: NEGATIVE
RBC # BLD AUTO: 3.48 MILL/MM3 (ref 4.2–5.4)
WBC # BLD AUTO: 3.7 THOU/MM3 (ref 4.8–10.8)

## 2024-10-17 PROCEDURE — 85027 COMPLETE CBC AUTOMATED: CPT

## 2024-10-17 PROCEDURE — 36415 COLL VENOUS BLD VENIPUNCTURE: CPT

## 2024-10-17 PROCEDURE — 3700000001 HC ADD 15 MINUTES (ANESTHESIA): Performed by: OBSTETRICS & GYNECOLOGY

## 2024-10-17 PROCEDURE — 6360000002 HC RX W HCPCS: Performed by: NURSE ANESTHETIST, CERTIFIED REGISTERED

## 2024-10-17 PROCEDURE — 3600000003 HC SURGERY LEVEL 3 BASE: Performed by: OBSTETRICS & GYNECOLOGY

## 2024-10-17 PROCEDURE — 81025 URINE PREGNANCY TEST: CPT

## 2024-10-17 PROCEDURE — 2709999900 HC NON-CHARGEABLE SUPPLY: Performed by: OBSTETRICS & GYNECOLOGY

## 2024-10-17 PROCEDURE — 7100000010 HC PHASE II RECOVERY - FIRST 15 MIN: Performed by: OBSTETRICS & GYNECOLOGY

## 2024-10-17 PROCEDURE — 7100000001 HC PACU RECOVERY - ADDTL 15 MIN: Performed by: OBSTETRICS & GYNECOLOGY

## 2024-10-17 PROCEDURE — 6360000002 HC RX W HCPCS

## 2024-10-17 PROCEDURE — 58661 LAPAROSCOPY REMOVE ADNEXA: CPT | Performed by: OBSTETRICS & GYNECOLOGY

## 2024-10-17 PROCEDURE — 3600000013 HC SURGERY LEVEL 3 ADDTL 15MIN: Performed by: OBSTETRICS & GYNECOLOGY

## 2024-10-17 PROCEDURE — 7100000011 HC PHASE II RECOVERY - ADDTL 15 MIN: Performed by: OBSTETRICS & GYNECOLOGY

## 2024-10-17 PROCEDURE — 3700000000 HC ANESTHESIA ATTENDED CARE: Performed by: OBSTETRICS & GYNECOLOGY

## 2024-10-17 PROCEDURE — 2500000003 HC RX 250 WO HCPCS: Performed by: NURSE ANESTHETIST, CERTIFIED REGISTERED

## 2024-10-17 PROCEDURE — 6370000000 HC RX 637 (ALT 250 FOR IP): Performed by: OBSTETRICS & GYNECOLOGY

## 2024-10-17 PROCEDURE — 7100000000 HC PACU RECOVERY - FIRST 15 MIN: Performed by: OBSTETRICS & GYNECOLOGY

## 2024-10-17 PROCEDURE — 88305 TISSUE EXAM BY PATHOLOGIST: CPT

## 2024-10-17 PROCEDURE — 2580000003 HC RX 258: Performed by: OBSTETRICS & GYNECOLOGY

## 2024-10-17 PROCEDURE — 2720000010 HC SURG SUPPLY STERILE: Performed by: OBSTETRICS & GYNECOLOGY

## 2024-10-17 RX ORDER — SODIUM CHLORIDE 0.9 % (FLUSH) 0.9 %
5-40 SYRINGE (ML) INJECTION EVERY 12 HOURS SCHEDULED
Status: DISCONTINUED | OUTPATIENT
Start: 2024-10-17 | End: 2024-10-17 | Stop reason: HOSPADM

## 2024-10-17 RX ORDER — SODIUM CHLORIDE 9 MG/ML
INJECTION, SOLUTION INTRAVENOUS PRN
Status: DISCONTINUED | OUTPATIENT
Start: 2024-10-17 | End: 2024-10-17 | Stop reason: HOSPADM

## 2024-10-17 RX ORDER — MIDAZOLAM HYDROCHLORIDE 1 MG/ML
INJECTION INTRAMUSCULAR; INTRAVENOUS
Status: DISCONTINUED | OUTPATIENT
Start: 2024-10-17 | End: 2024-10-17 | Stop reason: SDUPTHER

## 2024-10-17 RX ORDER — MEPERIDINE HYDROCHLORIDE 25 MG/ML
25 INJECTION INTRAMUSCULAR; INTRAVENOUS; SUBCUTANEOUS ONCE
Status: COMPLETED | OUTPATIENT
Start: 2024-10-17 | End: 2024-10-17

## 2024-10-17 RX ORDER — SODIUM CHLORIDE 9 MG/ML
INJECTION, SOLUTION INTRAVENOUS PRN
Status: CANCELLED | OUTPATIENT
Start: 2024-10-17

## 2024-10-17 RX ORDER — ACETAMINOPHEN 500 MG
1000 TABLET ORAL EVERY 8 HOURS PRN
Status: DISCONTINUED | OUTPATIENT
Start: 2024-10-17 | End: 2024-10-17 | Stop reason: HOSPADM

## 2024-10-17 RX ORDER — SODIUM CHLORIDE 0.9 % (FLUSH) 0.9 %
5-40 SYRINGE (ML) INJECTION PRN
Status: DISCONTINUED | OUTPATIENT
Start: 2024-10-17 | End: 2024-10-17 | Stop reason: HOSPADM

## 2024-10-17 RX ORDER — SODIUM CHLORIDE 0.9 % (FLUSH) 0.9 %
5-40 SYRINGE (ML) INJECTION EVERY 12 HOURS SCHEDULED
Status: CANCELLED | OUTPATIENT
Start: 2024-10-17

## 2024-10-17 RX ORDER — SODIUM CHLORIDE 9 MG/ML
INJECTION, SOLUTION INTRAVENOUS CONTINUOUS
Status: DISCONTINUED | OUTPATIENT
Start: 2024-10-17 | End: 2024-10-17

## 2024-10-17 RX ORDER — DEXAMETHASONE SODIUM PHOSPHATE 10 MG/ML
INJECTION, EMULSION INTRAMUSCULAR; INTRAVENOUS
Status: DISCONTINUED | OUTPATIENT
Start: 2024-10-17 | End: 2024-10-17 | Stop reason: SDUPTHER

## 2024-10-17 RX ORDER — IBUPROFEN 600 MG/1
600 TABLET, FILM COATED ORAL EVERY 8 HOURS PRN
Status: DISCONTINUED | OUTPATIENT
Start: 2024-10-17 | End: 2024-10-17 | Stop reason: HOSPADM

## 2024-10-17 RX ORDER — SODIUM CHLORIDE 0.9 % (FLUSH) 0.9 %
5-40 SYRINGE (ML) INJECTION PRN
Status: CANCELLED | OUTPATIENT
Start: 2024-10-17

## 2024-10-17 RX ORDER — PROPOFOL 10 MG/ML
INJECTION, EMULSION INTRAVENOUS
Status: DISCONTINUED | OUTPATIENT
Start: 2024-10-17 | End: 2024-10-17 | Stop reason: SDUPTHER

## 2024-10-17 RX ORDER — LIDOCAINE HCL/PF 100 MG/5ML
SYRINGE (ML) INJECTION
Status: DISCONTINUED | OUTPATIENT
Start: 2024-10-17 | End: 2024-10-17 | Stop reason: SDUPTHER

## 2024-10-17 RX ORDER — ROCURONIUM BROMIDE 10 MG/ML
INJECTION, SOLUTION INTRAVENOUS
Status: DISCONTINUED | OUTPATIENT
Start: 2024-10-17 | End: 2024-10-17 | Stop reason: SDUPTHER

## 2024-10-17 RX ORDER — ONDANSETRON 2 MG/ML
INJECTION INTRAMUSCULAR; INTRAVENOUS
Status: DISCONTINUED | OUTPATIENT
Start: 2024-10-17 | End: 2024-10-17 | Stop reason: SDUPTHER

## 2024-10-17 RX ORDER — MEPERIDINE HYDROCHLORIDE 25 MG/ML
INJECTION INTRAMUSCULAR; INTRAVENOUS; SUBCUTANEOUS
Status: COMPLETED
Start: 2024-10-17 | End: 2024-10-17

## 2024-10-17 RX ORDER — OXYCODONE HYDROCHLORIDE 5 MG/1
5 TABLET ORAL EVERY 6 HOURS PRN
Qty: 28 TABLET | Refills: 0 | Status: SHIPPED | OUTPATIENT
Start: 2024-10-17 | End: 2024-10-24

## 2024-10-17 RX ORDER — FENTANYL CITRATE 50 UG/ML
INJECTION, SOLUTION INTRAMUSCULAR; INTRAVENOUS
Status: DISCONTINUED | OUTPATIENT
Start: 2024-10-17 | End: 2024-10-17 | Stop reason: SDUPTHER

## 2024-10-17 RX ORDER — OXYCODONE HYDROCHLORIDE 5 MG/1
5 TABLET ORAL EVERY 4 HOURS PRN
Status: DISCONTINUED | OUTPATIENT
Start: 2024-10-17 | End: 2024-10-17 | Stop reason: HOSPADM

## 2024-10-17 RX ADMIN — DEXAMETHASONE SODIUM PHOSPHATE 10 MG: 10 INJECTION, EMULSION INTRAMUSCULAR; INTRAVENOUS at 10:31

## 2024-10-17 RX ADMIN — FENTANYL CITRATE 50 MCG: 50 INJECTION, SOLUTION INTRAMUSCULAR; INTRAVENOUS at 10:50

## 2024-10-17 RX ADMIN — OXYCODONE 5 MG: 5 TABLET ORAL at 12:20

## 2024-10-17 RX ADMIN — SODIUM CHLORIDE: 9 INJECTION, SOLUTION INTRAVENOUS at 09:21

## 2024-10-17 RX ADMIN — PROPOFOL 150 MG: 10 INJECTION, EMULSION INTRAVENOUS at 10:21

## 2024-10-17 RX ADMIN — MEPERIDINE HYDROCHLORIDE 25 MG: 25 INJECTION INTRAMUSCULAR; INTRAVENOUS; SUBCUTANEOUS at 11:35

## 2024-10-17 RX ADMIN — ONDANSETRON 4 MG: 2 INJECTION INTRAMUSCULAR; INTRAVENOUS at 11:07

## 2024-10-17 RX ADMIN — FENTANYL CITRATE 50 MCG: 50 INJECTION, SOLUTION INTRAMUSCULAR; INTRAVENOUS at 11:26

## 2024-10-17 RX ADMIN — SUGAMMADEX 200 MG: 100 INJECTION, SOLUTION INTRAVENOUS at 11:21

## 2024-10-17 RX ADMIN — ROCURONIUM BROMIDE 50 MG: 10 INJECTION INTRAVENOUS at 10:21

## 2024-10-17 RX ADMIN — FENTANYL CITRATE 100 MCG: 50 INJECTION, SOLUTION INTRAMUSCULAR; INTRAVENOUS at 10:21

## 2024-10-17 RX ADMIN — Medication 100 MG: at 10:21

## 2024-10-17 RX ADMIN — IBUPROFEN 600 MG: 600 TABLET, FILM COATED ORAL at 12:20

## 2024-10-17 RX ADMIN — MIDAZOLAM 2 MG: 1 INJECTION INTRAMUSCULAR; INTRAVENOUS at 10:21

## 2024-10-17 ASSESSMENT — PAIN DESCRIPTION - DESCRIPTORS: DESCRIPTORS: ACHING;DISCOMFORT

## 2024-10-17 ASSESSMENT — PAIN SCALES - GENERAL
PAINLEVEL_OUTOF10: 4
PAINLEVEL_OUTOF10: 6

## 2024-10-17 ASSESSMENT — PAIN - FUNCTIONAL ASSESSMENT: PAIN_FUNCTIONAL_ASSESSMENT: 0-10

## 2024-10-17 ASSESSMENT — PAIN DESCRIPTION - LOCATION: LOCATION: ABDOMEN

## 2024-10-17 NOTE — OP NOTE
Gyn Service    Operative Report        Pt Name: Tess Marley  MRN: [id number] Acct #: [id number]  YOB: 1975  Procedure Performed By: Addie Peterson MD, MD      Pre-operative Diagnosis:  Breast cancer- ER/SC pos    Post-operative Diagnosis:  SAME    PMH:  Past Medical History:   Diagnosis Date    Anxiety     Cancer (HCC)     Migraine        Procedure:  Exploratory laparoscopy    Surgeon:  Addie Peterson MD     Anesthesia:  General    Estimated blood loss: 5cc    Findings:  normal appearing ovaries    Complications:  NONE    Specimens: bilateral tubes and ovaries    Procedure:     The patient was taken to the operating room where she was placed  under general anesthesia in dorsolithotomy position, prepped and draped.  On  exam, the cervix was mobile and in mid position.  The cervix was grasped with  a single-tooth tenaculum and sounded to 7 cm and was progressively dilated. A uterine manipulator was placed. Attention was then turned to the abdominal portion of the procedure.    A 5mm Visiport was used to gain entry into the abdominal cavity under direct visualization. Insufflation occurred under direct visualization as well. 11 mm suprapubic port and 5 mm LLQ port were placed under direct visualization as well. Inspection of the abdomen revealed normal uterus tubes and ovaries. ON the left side, the tube and ovary were elevated and coming across the IP ligament and utero-ovarian ligament, the left tube/ovary were ligated and placed anterior to the uterus. The same procedure was done on the right side. Both ovaries were placed into an endocatch bag and removed from the abdomen without difficulty.   Adequate hemostasis was noted. All instruments were then removed from the abdomen. The fascia of the suprapubic incision was closed with 2-0 vicryl. Skin incisions were closed using 4-0 Vicryl. The patient was awakened from general anesthesia and taken to the Recovery Room in good  condition.        Addie Peterson MD, MD 10/17/2024 11:24 AM

## 2024-10-17 NOTE — PROGRESS NOTES

## 2024-10-17 NOTE — ANESTHESIA PRE PROCEDURE
Department of Anesthesiology  Preprocedure Note       Name:  Tess Marley   Age:  49 y.o.  :  1975                                          MRN:  [id number]         Date:  10/17/2024      Surgeon: Surgeon(s):  Addie Peterson MD    Procedure: Procedure(s):  LAPAROSCOPIC BILATERAL SALPINGO- OOPHORECTOMY    Medications prior to admission:   Prior to Admission medications    Medication Sig Start Date End Date Taking? Authorizing Provider   amLODIPine (NORVASC) 5 MG tablet Take 1 tablet by mouth daily 24  Yes Parviz Foster MD   anastrozole (ARIMIDEX) 1 MG tablet Take 1 tablet by mouth daily 24  Yes ProviderMiguel Angel MD   Abemaciclib 150 MG TABS Take 150 mg by mouth 2 times daily 24  Yes Provider, MD Miguel Angel   letrozole (FEMARA) 2.5 MG tablet Take 1 tablet by mouth daily 24  Yes ProviderMiguel Angel MD   pantoprazole (PROTONIX) 40 MG tablet Take 1 tablet by mouth daily  Patient not taking: Reported on 10/17/2024 12/4/23   Parviz Foster MD   ibuprofen (ADVIL;MOTRIN) 600 MG tablet Take 1 tablet by mouth every 8 hours as needed for Pain 16  Юлия Casiano, APRN - CNP       Current medications:    Current Facility-Administered Medications   Medication Dose Route Frequency Provider Last Rate Last Admin    0.9 % sodium chloride infusion   IntraVENous Continuous Addie Peterson  mL/hr at 10/17/24 0921 New Bag at 10/17/24 0921    sodium chloride flush 0.9 % injection 5-40 mL  5-40 mL IntraVENous 2 times per day Addie Peterson MD        sodium chloride flush 0.9 % injection 5-40 mL  5-40 mL IntraVENous PRN Addie Peterson MD        0.9 % sodium chloride infusion   IntraVENous PRN Addie Peterson MD           Allergies:  No Known Allergies    Problem List:    Patient Active Problem List   Diagnosis Code    Breast cancer metastasized to axillary lymph node, left (HCC) C50.912, C77.3       Past Medical History:        Diagnosis Date    Anxiety

## 2024-10-17 NOTE — DISCHARGE SUMMARY
GYN Surgery  Discharge Summary     Patient ID:  Tess Marley  [id number]  49 y.o.  1975    Admit date: 10/17/2024    Admitting Physician: Addie Peterson MD    Discharge Diagnoses: Malignant neoplasm of female breast, unspecified estrogen receptor status, unspecified laterality, unspecified site of breast (HCC) [C50.919]    Discharged Condition: good      Procedures Performed: Laparoscopic BSO    Hospital Course: Patient was admitted on the day of surgery, and underwent an uncomplicated procedure.  See dictated op note.     Disposition: home    Patient Instructions:   Activity: activity as tolerated  Diet: regular  Wound Care: as directed    Discharge Medication:      Medication List        CONTINUE taking these medications      Abemaciclib 150 MG Tabs     amLODIPine 5 MG tablet  Commonly known as: NORVASC  Take 1 tablet by mouth daily     anastrozole 1 MG tablet  Commonly known as: ARIMIDEX     letrozole 2.5 MG tablet  Commonly known as: FEMARA            STOP taking these medications      ibuprofen 600 MG tablet  Commonly known as: ADVIL;MOTRIN     pantoprazole 40 MG tablet  Commonly known as: PROTONIX               Discharge Date: 10/17/24    Condition: Good    Follow-up with Addie Peterson MD in 1 week    Signed:  Electronically signed by Addie Peterson MD on 10/17/2024 at 11:23 AM

## 2024-10-17 NOTE — PROGRESS NOTES
1128: pt arrives to pacu, respirations unlabored on Room air.  Pt awakes to voice and follows commands at this time. Denies pain at this time. Shivering noted, warming blanket applied.     1135: pt continues to shiver, medicated with 25 mg demerol.     1145: pt requesting ice chips, tolerates well.     1158: pt meets criteria for discharge from pacu at this time. pt transported to [facility] in stable condition     1203: report given to Fany FENG in [facility].

## 2024-10-17 NOTE — ANESTHESIA POSTPROCEDURE EVALUATION
--   10/17/24 0843 136/81 97.6 °F (36.4 °C) Temporal 60 16 97 % 1.702 m (5' 7\") 89.2 kg (196 lb 9.6 oz)       Level of Consciousness:  Awake    Respiratory:  Stable    Oxygen Saturation:  Stable    Cardiovascular:  Stable    Hydration:  Adequate    PONV:  Stable    Post-op Pain:  Adequate analgesia    Post-op Assessment:  No apparent anesthetic complications    Additional Follow-Up / Treatment / Comment:  None    HARSHA SANDERS MD  October 17, 2024   12:11 PM      No notable events documented.

## 2024-10-17 NOTE — DISCHARGE INSTRUCTIONS
Discharge Instructions  Call Surgeon if you have:  Temperature greater than 100.4  Persistent nausea and vomiting  Severe uncontrolled pain  Redness, tenderness, or signs of infection (pain, swelling, redness, odor or green/yellow discharge around the site)  Difficulty breathing, headache or visual disturbances  Hives  Persistent dizziness or light-headedness  Extreme fatigue  Any other questions or concerns you may have after discharge    In an emergency, call 911 or go to an Emergency Department at a nearby hospital    It is important to bring a complete, current list of your medications to any medical appointments or hospitalizations.    REMINDER:   Carry a list of your medications and allergies with you at all times  Call your pharmacy at least 1 week in advance to refill prescriptions    Diet: Resume your usual diet. Good nutrition promotes healing. Increase fluid intake.         Follow up 1 weeks . Call for appointment.    I understand and acknowledge receipt of the above instructions.                                                                                                                                          Patient or Guardian Signature                                                         Date/Time                                                                                                                                            Physician's or R.N.'s Signature                                                                  Date/Time      The discharge instructions have been reviewed with the patient and/or Guardian.  Patient and/or Guardian signed and retained a printed copy.    Addie Peterson MD, MD 10/17/2024 11:22 AM

## 2024-10-17 NOTE — PROGRESS NOTES
Patient oriented to Same Day department and admitted to Same Day Surgery room 12.   Patient verbalized approval for first name, last initial with physician name on unit whiteboard.     Plan of care reviewed with patient.   Patient room whiteboard filled out and discussed with patient and responsible adult.   Patient and responsible adult offered Same Day Welcome Packet to review.    Call light in reach.   Bed in lowest position, locked, with one bed rail up.   SCDs and warming blanket in place.  Appropriate arm bands on patient.   Bathroom offered.   All questions and concerns of patient addressed.        Meds to Beds:   Patient informed of [facility] to [facility] program during admission. Patient is agreeable to program.   Contact information for the pharmacy and the Meds to Beds program:          Su Nicole  [phone]

## 2024-10-17 NOTE — H&P
[place], [place]  History and Physicial      Patient:  Tess Marley  YOB: 1975  MRN: [id number]     Acct: [id number]    HISTORY OF PRESENT ILLNESS:     Tess Marley is a 49 y.o. female No obstetric history on file. With hormone sensitive breast cancer here for ovary removal.    Obstetric History:   OB History   No obstetric history on file.       Past Medical History:        Diagnosis Date    Anxiety     Cancer (HCC)     Migraine        Past Surgical History:        Procedure Laterality Date    BREAST SURGERY      ENDOMETRIAL ABLATION  2019    TONSILLECTOMY      TUBAL LIGATION         Medications: Scheduled Meds:   sodium chloride flush  5-40 mL IntraVENous 2 times per day     Continuous Infusions:   sodium chloride 125 mL/hr at 10/17/24 0921    sodium chloride       PRN Meds:.sodium chloride flush, sodium chloride    Allergies:  Patient has no known allergies.    Social History:    reports that she has never smoked. She has never used smokeless tobacco. She reports that she does not currently use alcohol. She reports that she does not use drugs.    Family History:       Problem Relation Age of Onset    Cancer Mother         Cervical    Cancer Paternal Aunt         Lung    Cancer Paternal Uncle         Kidney    Cancer Maternal Grandmother         Lymphoma    Breast Cancer Paternal Grandmother        Review of Systems:  General ROS: negative  Respiratory ROS: negative  Cardiovascular ROS: negative  Gastrointestinal ROS: negative  Musculoskeletal ROS: negative  Neurological ROS: negative    Physical Exam:    Vitals:Patient Vitals for the past 24 hrs:   BP Temp Temp src Pulse Resp SpO2 Height Weight   10/17/24 0843 136/81 97.6 °F (36.4 °C) Temporal 60 16 97 % 1.702 m (5' 7\") 89.2 kg (196 lb 9.6 oz)          Wt Readings from Last 1 Encounters:   10/17/24 89.2 kg (196 lb 9.6 oz)         General appearance - alert, well appearing, and in no distress  Abdomen - deferred  Pelvic

## 2024-10-17 NOTE — PROGRESS NOTES
Pt returned to [facility] room 12. Vitals and assessment as charted. 0.9 infusing, @900ml to count from PACU. Pt has snacks and drink. Family at the bedside. Pt and family verbalized understanding of discharge criteria and call light use. Call light in reach.

## 2024-10-23 ENCOUNTER — HOSPITAL ENCOUNTER (EMERGENCY)
Age: 49
Discharge: HOME | End: 2024-10-23
Payer: COMMERCIAL

## 2024-10-23 VITALS
RESPIRATION RATE: 20 BRPM | DIASTOLIC BLOOD PRESSURE: 89 MMHG | TEMPERATURE: 98.7 F | WEIGHT: 195 LBS | SYSTOLIC BLOOD PRESSURE: 136 MMHG | OXYGEN SATURATION: 98 % | HEART RATE: 75 BPM | BODY MASS INDEX: 30.54 KG/M2

## 2024-10-23 DIAGNOSIS — M62.838 SPASM OF MUSCLE: Primary | ICD-10-CM

## 2024-10-23 DIAGNOSIS — M54.6 ACUTE LEFT-SIDED THORACIC BACK PAIN: ICD-10-CM

## 2024-10-23 PROCEDURE — 99213 OFFICE O/P EST LOW 20 MIN: CPT

## 2024-10-23 RX ORDER — CYCLOBENZAPRINE HCL 10 MG
10 TABLET ORAL 3 TIMES DAILY PRN
Qty: 21 TABLET | Refills: 0 | Status: SHIPPED | OUTPATIENT
Start: 2024-10-23 | End: 2024-11-02

## 2024-10-23 RX ORDER — PREDNISONE 20 MG/1
20 TABLET ORAL 2 TIMES DAILY
Qty: 10 TABLET | Refills: 0 | Status: SHIPPED | OUTPATIENT
Start: 2024-10-23 | End: 2024-10-28

## 2024-10-23 ASSESSMENT — PAIN DESCRIPTION - PAIN TYPE: TYPE: ACUTE PAIN

## 2024-10-23 ASSESSMENT — PAIN DESCRIPTION - LOCATION: LOCATION: BACK

## 2024-10-23 ASSESSMENT — PAIN DESCRIPTION - FREQUENCY: FREQUENCY: INTERMITTENT

## 2024-10-23 ASSESSMENT — ENCOUNTER SYMPTOMS: BACK PAIN: 1

## 2024-10-23 ASSESSMENT — PAIN DESCRIPTION - DESCRIPTORS: DESCRIPTORS: ACHING

## 2024-10-23 ASSESSMENT — PAIN - FUNCTIONAL ASSESSMENT
PAIN_FUNCTIONAL_ASSESSMENT: 0-10
PAIN_FUNCTIONAL_ASSESSMENT: ACTIVITIES ARE NOT PREVENTED

## 2024-10-23 ASSESSMENT — PAIN SCALES - GENERAL: PAINLEVEL_OUTOF10: 8

## 2024-10-23 ASSESSMENT — PAIN DESCRIPTION - ORIENTATION: ORIENTATION: LEFT;UPPER

## 2024-10-23 NOTE — ED TRIAGE NOTES
Patient ambulated to room with c/o left, upper back pain beginning two weeks ago. Pain increases with movement. Denies injury.

## 2024-10-23 NOTE — ED PROVIDER NOTES
[facility] URGENT CARE  Urgent Care Encounter       CHIEF COMPLAINT       Chief Complaint   Patient presents with    Back Pain     Left, upper       Nurses Notes reviewed and I agree except as noted in the HPI.  HISTORY OF PRESENT ILLNESS   Tess Marley is a 49 y.o. female who presents with concerns of left upper back pain for the past two weeks. Patient denies any known injury, however pain increase with movement.     HPI    REVIEW OF SYSTEMS     Review of Systems   Constitutional:  Negative for fatigue and fever.   Musculoskeletal:  Positive for back pain.   All other systems reviewed and are negative.      PAST MEDICAL HISTORY         Diagnosis Date    Anxiety     Cancer (HCC)     Migraine        SURGICALHISTORY     Patient  has a past surgical history that includes Tonsillectomy; Tubal ligation; Breast surgery; Endometrial ablation (2019); and Salpingo-oophorectomy (N/A, 10/17/2024).    CURRENT MEDICATIONS       Discharge Medication List as of 10/23/2024 10:48 AM        CONTINUE these medications which have NOT CHANGED    Details   oxyCODONE (ROXICODONE) 5 MG immediate release tablet Take 1 tablet by mouth every 6 hours as needed for Pain for up to 7 days. Max Daily Amount: 20 mg, Disp-28 tablet, R-0Normal      amLODIPine (NORVASC) 5 MG tablet Take 1 tablet by mouth daily, Disp-90 tablet, R-3Normal      anastrozole (ARIMIDEX) 1 MG tablet Take 1 tablet by mouth dailyHistorical Med      Abemaciclib 150 MG TABS Take 150 mg by mouth 2 times dailyHistorical Med      letrozole (FEMARA) 2.5 MG tablet Take 1 tablet by mouth dailyHistorical Med             ALLERGIES     Patient is has No Known Allergies.    Patients   There is no immunization history on file for this patient.    FAMILY HISTORY     Patient's family history includes Breast Cancer in her paternal grandmother; Cancer in her maternal grandmother, mother, paternal aunt, and paternal uncle.    SOCIAL HISTORY     Patient  reports that she has  - No data to display         PROCEDURES:  None    FINAL IMPRESSION      1. Spasm of muscle    2. Acute left-sided thoracic back pain          DISPOSITION/ PLAN     Patient seen and evaluated for the above. The patient will be instructed to continue taking anti-inflammatory medication, and given a Rx for short course of muscle relaxer's. Instructed on rest,Ice 15-20 minutes TID x 2 days,Then Heat 15-20 minutes TID as needed. The patient will be given stretching exercises, and instructed to follow up with their PCP or community clinic for further evaluation.  The patient should return to the ED if the pain worsens. The patient is in agreement with this plan. The patient tolerated their visit well.  The patient and / or the family were informed of the results of any tests, a time was given to answer questions, a plan was proposed and they agreed with plan. Follow up with PCP ×2-3 days for reevaluation and further management of care.      PATIENT REFERRED TO:  Danny Worthington MD  [address] [address]      DISCHARGE MEDICATIONS:  Discharge Medication List as of 10/23/2024 10:48 AM        START taking these medications    Details   predniSONE (DELTASONE) 20 MG tablet Take 1 tablet by mouth 2 times daily for 5 days, Disp-10 tablet, R-0Normal      cyclobenzaprine (FLEXERIL) 10 MG tablet Take 1 tablet by mouth 3 times daily as needed for Muscle spasms, Disp-21 tablet, R-0Normal             Discharge Medication List as of 10/23/2024 10:48 AM        STOP taking these medications       ibuprofen (ADVIL;MOTRIN) 600 MG tablet Comments:   Reason for Stopping:               Discharge Medication List as of 10/23/2024 10:48 AM          ABIGAIL Castaneda CNP    (Please note that portions of this note were completed with a voice recognition program. Efforts were made to edit the dictations but occasionally words are mis-transcribed.)            Dahlia Ho APRN - CNP  10/23/24 [id number]

## 2024-10-23 NOTE — DISCHARGE INSTRUCTIONS
Medications as prescribed.  Rotate heat and ice.  Biofreeze or IcyHot.  Tylenol / Ibuprofen as needed for fever and or pain.  Follow up with PCP in 3-5 days if no improvement or sooner with worsening symptoms.

## 2024-10-29 ENCOUNTER — HOSPITAL ENCOUNTER (EMERGENCY)
Age: 49
Discharge: HOME | End: 2024-10-29
Payer: COMMERCIAL

## 2024-10-29 VITALS
HEART RATE: 73 BPM | SYSTOLIC BLOOD PRESSURE: 125 MMHG | TEMPERATURE: 97 F | RESPIRATION RATE: 16 BRPM | OXYGEN SATURATION: 98 % | DIASTOLIC BLOOD PRESSURE: 86 MMHG

## 2024-10-29 DIAGNOSIS — J06.9 UPPER RESPIRATORY TRACT INFECTION, UNSPECIFIED TYPE: Primary | ICD-10-CM

## 2024-10-29 DIAGNOSIS — J02.9 ACUTE PHARYNGITIS, UNSPECIFIED ETIOLOGY: ICD-10-CM

## 2024-10-29 LAB — S PYO AG THROAT QL: NEGATIVE

## 2024-10-29 PROCEDURE — 99213 OFFICE O/P EST LOW 20 MIN: CPT

## 2024-10-29 PROCEDURE — 87651 STREP A DNA AMP PROBE: CPT

## 2024-10-29 ASSESSMENT — ENCOUNTER SYMPTOMS
CONSTIPATION: 0
SORE THROAT: 1
SHORTNESS OF BREATH: 0
ABDOMINAL PAIN: 0
COLOR CHANGE: 0
VOMITING: 0
NAUSEA: 0
COUGH: 0
DIARRHEA: 0
EYE DISCHARGE: 0
EYE PAIN: 0
SINUS PRESSURE: 0
RHINORRHEA: 1
SINUS PAIN: 0

## 2024-10-29 NOTE — DISCHARGE INSTRUCTIONS
I recommend you start using a daily antihistamine, such as Zyrtec.    You can use chlorpheniramine maleate as needed for cough at night and postnasal drip.    You can use Mucinex, I recommended without the DM.  Ensure that they are drinking extra water with this, take separately with a full glass of water.  You can use Myriam pot or nasal rinse for symptom relief of sinus pressure.    Monitor for worsening signs and symptoms, such as shortness of breath or chest tightness that is ongoing.

## 2024-10-29 NOTE — ED NOTES
To [facility] with complaints of sore throat, ear pain, cough chest tightness. Started yesterday     Erendira Bolaños RN  10/29/24 [id number]

## 2024-10-29 NOTE — ED PROVIDER NOTES
Nose: Congestion present.      Mouth/Throat:      Lips: Pink.      Mouth: Mucous membranes are moist.      Tongue: No lesions.      Pharynx: Posterior oropharyngeal erythema and postnasal drip present. No pharyngeal swelling or oropharyngeal exudate.   Eyes:      Extraocular Movements: Extraocular movements intact.      Conjunctiva/sclera: Conjunctivae normal.      Pupils: Pupils are equal, round, and reactive to light.   Cardiovascular:      Rate and Rhythm: Normal rate and regular rhythm.      Pulses: Normal pulses.      Heart sounds: Normal heart sounds.   Pulmonary:      Effort: Pulmonary effort is normal.      Breath sounds: Normal breath sounds.   Abdominal:      General: Bowel sounds are normal.      Palpations: Abdomen is soft.      Tenderness: There is no abdominal tenderness.   Musculoskeletal:         General: Normal range of motion.   Lymphadenopathy:      Head:      Right side of head: No submental, submandibular, tonsillar, preauricular, posterior auricular or occipital adenopathy.      Left side of head: No submental, submandibular, tonsillar, preauricular, posterior auricular or occipital adenopathy.   Skin:     General: Skin is warm and dry.      Capillary Refill: Capillary refill takes less than 2 seconds.   Neurological:      General: No focal deficit present.      Mental Status: She is alert and oriented to person, place, and time. Mental status is at baseline.   Psychiatric:         Mood and Affect: Mood normal.         Behavior: Behavior normal. Behavior is cooperative.         Thought Content: Thought content normal.         Judgment: Judgment normal.         DIAGNOSTIC RESULTS     Labs:  Results for orders placed or performed during the hospital encounter of 10/29/24   Strep Screen Group A Throat   Result Value Ref Range    Rapid Strep A Screen NEGATIVE        IMAGING:    No orders to display         EKG:      URGENT CARE COURSE:     Vitals:    10/29/24 0911   BP: 125/86   Pulse: 73   Resp:  tablet Comments:   Reason for Stopping:               Discharge Medication List as of 10/29/2024  9:34 AM          ABIGAIL Allen CNP    (Please note that portions of this note were completed with a voice recognition program. Efforts were made to edit the dictations but occasionally words are mis-transcribed.)            Cristin Eller APRN - CNP  10/29/24 1029

## 2024-11-01 ENCOUNTER — HOSPITAL ENCOUNTER (OUTPATIENT)
Dept: RADIATION ONCOLOGY | Age: 49
Discharge: HOME | End: 2024-11-01
Payer: COMMERCIAL

## 2024-11-01 VITALS
WEIGHT: 200.6 LBS | SYSTOLIC BLOOD PRESSURE: 136 MMHG | BODY MASS INDEX: 31.42 KG/M2 | RESPIRATION RATE: 16 BRPM | OXYGEN SATURATION: 94 % | TEMPERATURE: 97.3 F | DIASTOLIC BLOOD PRESSURE: 86 MMHG | HEART RATE: 91 BPM

## 2024-11-01 PROCEDURE — 99214 OFFICE O/P EST MOD 30 MIN: CPT

## 2024-11-01 PROCEDURE — 99212 OFFICE O/P EST SF 10 MIN: CPT

## 2024-11-01 ASSESSMENT — ENCOUNTER SYMPTOMS
SHORTNESS OF BREATH: 0
TROUBLE SWALLOWING: 0
BACK PAIN: 0
NAUSEA: 0
ABDOMINAL PAIN: 0
RECTAL PAIN: 0
BLOOD IN STOOL: 0
DIARRHEA: 0
VOMITING: 0
SORE THROAT: 1
COUGH: 1
CONSTIPATION: 0

## 2024-11-01 NOTE — PROGRESS NOTES
"Chief Complaint   Patient presents with   • Drug Overdose     pt reports she was in her bedroom and felt an impulse to take 300mg of benadryl at around 0030. She reports that this was not an attempt to end her life and tried to make herself vomit with no success. She reports she is having auditory hallucinations. Pt is alert and awake at this time. She called poison control and was informed to come to the ED. She has also taken 400 mg tegratol at 0030.       /88   Pulse 87   Temp 36.4 °C (97.6 °F)   Resp 16   Ht 1.6 m (5' 3\")   Wt 73.3 kg (161 lb 9.6 oz)   LMP 05/15/2020 (Exact Date)   SpO2 98%   BMI 28.63 kg/m²     Pt is aaox4, ambulatory with steady gait and alert at this time. She does not appear to be lethargic and is not complaining of any chest pain, h/a, dizziness. Charge Rn informed of pt.     "
times daily      letrozole (FEMARA) 2.5 MG tablet Take 1 tablet by mouth daily       No current facility-administered medications for this encounter.       All portions of this note that were completed during the initial nursing assessment were discussed and reviewed in detail with the nursing staff member who completed this portion of the note and I agree with the information and assessment as written. A complete review of systems was performed and found to be negative except as presented above.    PHYSICAL EXAMINATION:  VITAL SIGNS: /86   Pulse 91   Temp 97.3 °F (36.3 °C) (Infrared)   Resp 16   Wt 91 kg (200 lb 9.6 oz)   LMP 2015   SpO2 94%   BMI 31.42 kg/m²     ECO - Asymptomatic (Fully active, able to carry on all pre-disease activities without restriction)    Physical Exam  Constitutional:       General: She is not in acute distress.     Appearance: Normal appearance.   HENT:      Head: Normocephalic and atraumatic.   Pulmonary:      Effort: Pulmonary effort is normal. No respiratory distress.   Chest:   Breasts:     Right: No swelling, bleeding, inverted nipple, mass, nipple discharge, skin change or tenderness.      Left: No swelling, bleeding, inverted nipple or nipple discharge.      Comments: Left: Surgical scar well-healed with tender soft tissue density suggestive of scar tissue (patient states this is chronic and stable). Mild skin thickening of inferior breast suggestive of treatment changes. No dominant masses  Abdominal:      General: Abdomen is flat.   Lymphadenopathy:      Upper Body:      Right upper body: No supraclavicular or axillary adenopathy.      Left upper body: No supraclavicular or axillary adenopathy.   Neurological:      Mental Status: She is alert and oriented to person, place, and time.         ATTESTATION: 31 minutes were spent with the patient at today's visit reviewing pertinent information related to their oncologic diagnosis, including any recent labs,

## 2024-11-14 ENCOUNTER — APPOINTMENT (OUTPATIENT)
Dept: GENERAL RADIOLOGY | Age: 49
End: 2024-11-14
Payer: COMMERCIAL

## 2024-11-14 ENCOUNTER — HOSPITAL ENCOUNTER (EMERGENCY)
Age: 49
Discharge: HOME | End: 2024-11-14
Payer: COMMERCIAL

## 2024-11-14 VITALS
HEIGHT: 67 IN | BODY MASS INDEX: 31.39 KG/M2 | OXYGEN SATURATION: 98 % | TEMPERATURE: 98.3 F | SYSTOLIC BLOOD PRESSURE: 141 MMHG | WEIGHT: 200 LBS | DIASTOLIC BLOOD PRESSURE: 85 MMHG | RESPIRATION RATE: 20 BRPM | HEART RATE: 79 BPM

## 2024-11-14 DIAGNOSIS — J20.9 ACUTE BRONCHITIS, UNSPECIFIED ORGANISM: Primary | ICD-10-CM

## 2024-11-14 PROCEDURE — 87804 INFLUENZA ASSAY W/OPTIC: CPT

## 2024-11-14 PROCEDURE — 71046 X-RAY EXAM CHEST 2 VIEWS: CPT

## 2024-11-14 PROCEDURE — 99213 OFFICE O/P EST LOW 20 MIN: CPT

## 2024-11-14 PROCEDURE — 87635 SARS-COV-2 COVID-19 AMP PRB: CPT

## 2024-11-14 RX ORDER — TRAZODONE HYDROCHLORIDE 50 MG/1
1 TABLET, FILM COATED ORAL NIGHTLY PRN
COMMUNITY
Start: 2024-09-30

## 2024-11-14 RX ORDER — ALBUTEROL SULFATE 90 UG/1
2 INHALANT RESPIRATORY (INHALATION) 4 TIMES DAILY PRN
Qty: 18 G | Refills: 0 | Status: SHIPPED | OUTPATIENT
Start: 2024-11-14

## 2024-11-14 RX ORDER — DOXYCYCLINE HYCLATE 100 MG
100 TABLET ORAL 2 TIMES DAILY
Qty: 14 TABLET | Refills: 0 | Status: SHIPPED | OUTPATIENT
Start: 2024-11-14 | End: 2024-11-21

## 2024-11-14 RX ORDER — VENLAFAXINE HYDROCHLORIDE 37.5 MG/1
CAPSULE, EXTENDED RELEASE ORAL
COMMUNITY
Start: 2024-09-03

## 2024-11-14 ASSESSMENT — PAIN - FUNCTIONAL ASSESSMENT
PAIN_FUNCTIONAL_ASSESSMENT: 0-10
PAIN_FUNCTIONAL_ASSESSMENT: PREVENTS OR INTERFERES SOME ACTIVE ACTIVITIES AND ADLS

## 2024-11-14 ASSESSMENT — PAIN SCALES - GENERAL: PAINLEVEL_OUTOF10: 5

## 2024-11-14 ASSESSMENT — PAIN DESCRIPTION - PAIN TYPE: TYPE: ACUTE PAIN

## 2024-11-14 ASSESSMENT — ENCOUNTER SYMPTOMS
ALLERGIC/IMMUNOLOGIC NEGATIVE: 1
GASTROINTESTINAL NEGATIVE: 1
COUGH: 1
WHEEZING: 1
EYES NEGATIVE: 1

## 2024-11-14 ASSESSMENT — PAIN DESCRIPTION - DESCRIPTORS: DESCRIPTORS: DISCOMFORT

## 2024-11-14 ASSESSMENT — PAIN DESCRIPTION - ORIENTATION: ORIENTATION: RIGHT;LEFT

## 2024-11-14 ASSESSMENT — PAIN DESCRIPTION - LOCATION: LOCATION: EAR

## 2024-11-14 NOTE — DISCHARGE INSTRUCTIONS
I did discuss clinical findings with the patient as well as vital signs in assessment findings.  He was advised that the Patient has signs and symptoms of upper respiratory infection or bronchitis. Patient is afebrile and stable. Patient can use Tylenol and/or OTC cough syrup. Avoid tobacco use/exposure,Take medication as directed,Drink Lots of fluids and Use Inhalers as directed if prescribed.   Advised to follow up with family doctor in the next 2-3 days for reevaluation.  The patient may return to urgent care if does not get better or symptoms worsen.  However the patient is advised to go to ER immediately if present symptoms worsen, high fever >102 , vomiting, breathing difficulty, chest pain, lethargy or new symptoms develop. Patient/ parents understands this approach of home management and agrees to the treatment plan. I did discuss clinical findings with the patient as well as vital signs in assessment findings.  He was advised that the Patient has signs and symptoms of upper respiratory infection or bronchitis. Patient is afebrile and stable. Patient can use Tylenol and/or OTC cough syrup. Avoid tobacco use/exposure,Take medication as directed,Drink Lots of fluids and Use Inhalers as directed if prescribed.   Advised to follow up with family doctor in the next 2-3 days for reevaluation.  The patient may return to urgent care if does not get better or symptoms worsen.  However the patient is advised to go to ER immediately if present symptoms worsen, high fever >102 , vomiting, breathing difficulty, chest pain, lethargy or new symptoms develop. Patient/ parents understands this approach of home management and agrees to the treatment plan.

## 2024-11-14 NOTE — ED TRIAGE NOTES
Patient ambulated to room with complaint of cough, chest congestion and ear pressure that started Monday. Also reports history of cancer.

## 2024-11-14 NOTE — ED PROVIDER NOTES
[facility] URGENT CARE  Urgent Care Encounter       CHIEF COMPLAINT       Chief Complaint   Patient presents with    Cough    Ear Pain       Nurses Notes reviewed and I agree except as noted in the HPI.  HISTORY OF PRESENT ILLNESS   Tess Marley is a 49 y.o. female who presents with a 4-day history of cough congestion and left ear pain.  Patient reports productive cough and intermittent low-grade fever.  Patient reports history of breast cancer, states she has been in remission since January.  Patient reports taking over-the-counter Mucinex with minimal relief in symptoms    The history is provided by the patient. No  was used.       REVIEW OF SYSTEMS     Review of Systems   Constitutional:  Positive for fever (intermittent low grade fever).   HENT:  Positive for congestion, ear pain (bilateral ear pain) and postnasal drip.    Eyes: Negative.    Respiratory:  Positive for cough and wheezing.    Cardiovascular: Negative.    Gastrointestinal: Negative.    Endocrine: Negative.    Genitourinary: Negative.    Musculoskeletal: Negative.    Skin: Negative.    Allergic/Immunologic: Negative.    Neurological: Negative.    Hematological: Negative.    Psychiatric/Behavioral: Negative.         PAST MEDICAL HISTORY         Diagnosis Date    Anxiety     Cancer (HCC)     Migraine        SURGICALHISTORY     Patient  has a past surgical history that includes Tonsillectomy; Tubal ligation; Breast surgery; Endometrial ablation (2019); and Salpingo-oophorectomy (N/A, 10/17/2024).    CURRENT MEDICATIONS       Previous Medications    ABEMACICLIB 150 MG TABS    Take 150 mg by mouth 2 times daily    AMLODIPINE (NORVASC) 5 MG TABLET    Take 1 tablet by mouth daily    ANASTROZOLE (ARIMIDEX) 1 MG TABLET    Take 1 tablet by mouth daily    LETROZOLE (FEMARA) 2.5 MG TABLET    Take 1 tablet by mouth daily    NUTRITIONAL SUPPLEMENTS (COLD AND FLU PO)    Take by mouth    TRAZODONE (DESYREL) 50 MG TABLET     breath sounds and rhonchi. Examination of the left-lower field reveals decreased breath sounds and rhonchi. Decreased breath sounds, rhonchi and rales present.   Abdominal:      General: Abdomen is flat. Bowel sounds are normal.      Palpations: Abdomen is soft.   Musculoskeletal:         General: Normal range of motion.      Cervical back: Normal range of motion.   Skin:     General: Skin is warm and dry.      Capillary Refill: Capillary refill takes less than 2 seconds.   Neurological:      General: No focal deficit present.      Mental Status: She is alert and oriented to person, place, and time. Mental status is at baseline.   Psychiatric:         Mood and Affect: Mood normal.         Behavior: Behavior normal.         DIAGNOSTIC RESULTS     Labs:  Results for orders placed or performed during the hospital encounter of 11/14/24   COVID-19, Rapid    Specimen: Nasopharyngeal Swab   Result Value Ref Range    SARS-CoV-2, MARIIA NOT  DETECTED NOT DETECTED   Rapid influenza A/B antigens    Specimen: Nasopharyngeal   Result Value Ref Range    Flu A Antigen Negative NEGATIVE    Influenza B Ag, EIA Negative NEGATIVE       IMAGING:    XR CHEST (2 VW)   Final Result   Right infrahilar opacities which could be due to infiltrates or atelectasis.            **This report has been created using voice recognition software. It may contain   minor errors which are inherent in voice recognition technology.**      Electronically signed by Dr Negro Goyal            EKG:      URGENT CARE COURSE:     Vitals:    11/14/24 0819   BP: (!) 141/85   Pulse: 79   Resp: 20   Temp: 98.3 °F (36.8 °C)   TempSrc: Temporal   SpO2: 98%   Weight: 90.7 kg (200 lb)   Height: 1.702 m (5' 7\")       Medications - No data to display    ED Course as of 11/14/24 0905   Thu Nov 14, 2024   0856 XR CHEST (2 VW) [KB]      ED Course User Index  [KB] Maria Del Carmen Herndon APRN - NP       PROCEDURES:  None    FINAL IMPRESSION      1. Acute bronchitis,

## 2024-12-10 ENCOUNTER — APPOINTMENT (OUTPATIENT)
Dept: GENERAL RADIOLOGY | Age: 49
End: 2024-12-10
Payer: COMMERCIAL

## 2024-12-10 ENCOUNTER — HOSPITAL ENCOUNTER (EMERGENCY)
Age: 49
Discharge: HOME | End: 2024-12-10
Payer: COMMERCIAL

## 2024-12-10 VITALS
RESPIRATION RATE: 14 BRPM | BODY MASS INDEX: 31.39 KG/M2 | DIASTOLIC BLOOD PRESSURE: 93 MMHG | HEIGHT: 67 IN | HEART RATE: 72 BPM | WEIGHT: 200 LBS | OXYGEN SATURATION: 99 % | SYSTOLIC BLOOD PRESSURE: 143 MMHG | TEMPERATURE: 97 F

## 2024-12-10 DIAGNOSIS — M25.572 CHRONIC PAIN OF LEFT ANKLE: Primary | ICD-10-CM

## 2024-12-10 DIAGNOSIS — G89.29 CHRONIC PAIN OF LEFT ANKLE: Primary | ICD-10-CM

## 2024-12-10 PROCEDURE — 99213 OFFICE O/P EST LOW 20 MIN: CPT | Performed by: EMERGENCY MEDICINE

## 2024-12-10 PROCEDURE — 73610 X-RAY EXAM OF ANKLE: CPT

## 2024-12-10 PROCEDURE — 99213 OFFICE O/P EST LOW 20 MIN: CPT

## 2024-12-10 RX ORDER — METHYLPREDNISOLONE 4 MG/1
TABLET ORAL
Qty: 21 KIT | Refills: 0 | Status: SHIPPED | OUTPATIENT
Start: 2024-12-10 | End: 2024-12-16

## 2024-12-10 ASSESSMENT — PAIN SCALES - GENERAL: PAINLEVEL_OUTOF10: 7

## 2024-12-10 ASSESSMENT — PAIN - FUNCTIONAL ASSESSMENT: PAIN_FUNCTIONAL_ASSESSMENT: 0-10

## 2024-12-10 NOTE — ED PROVIDER NOTES
[facility] URGENT CARE  Urgent Care Encounter       CHIEF COMPLAINT       Chief Complaint   Patient presents with    Ankle Pain     Right on and off \" For months \"  Denies injury       Nurses Notes reviewed and I agree except as noted in the HPI.  HISTORY OF PRESENT ILLNESS   Tess Marley is a 49 y.o. female who presents for medial right ankle pain x 1 month.  Denies injury.  States standing on the foot aggravates the pain.  States it is difficult to get through her shift at work as standing is becoming intolerable.  She has been using Tylenol arthritis and ibuprofen for pain with no significant improvement.  States area does swell at times.  Denies any redness or increased warmth to the area.    HPI    REVIEW OF SYSTEMS     Review of Systems   Constitutional:  Negative for activity change, fatigue and fever.   Musculoskeletal:  Positive for arthralgias (right ankle) and gait problem. Negative for joint swelling.       PAST MEDICAL HISTORY         Diagnosis Date    Anxiety     Cancer (HCC)     Migraine        SURGICALHISTORY     Patient  has a past surgical history that includes Tonsillectomy; Tubal ligation; Breast surgery; Endometrial ablation (2019); and Salpingo-oophorectomy (N/A, 10/17/2024).    CURRENT MEDICATIONS       Discharge Medication List as of 12/10/2024  9:37 AM        CONTINUE these medications which have NOT CHANGED    Details   traZODone (DESYREL) 50 MG tablet Take 1 tablet by mouth nightly as neededHistorical Med      venlafaxine (EFFEXOR XR) 37.5 MG extended release capsule TAKE 1 CAPSULE BY MOUTH DAILY FOR 7 DAYS THEN TAKE 2 CAPSULES BY MOUTH DAILYHistorical Med      amLODIPine (NORVASC) 5 MG tablet Take 1 tablet by mouth daily, Disp-90 tablet, R-3Normal      letrozole (FEMARA) 2.5 MG tablet Take 1 tablet by mouth dailyHistorical Med      Nutritional Supplements (COLD AND FLU PO) Take by mouthHistorical Med      albuterol sulfate HFA (VENTOLIN HFA) 108 (90 Base) MCG/ACT

## 2024-12-10 NOTE — ED TRIAGE NOTES
Ambulatory to room 3 with slight limp  declines wheelchair. C/o right ankle swelling and pain on and off for months.  Declines taking anything for paintoday

## 2024-12-10 NOTE — DISCHARGE INSTRUCTIONS
Follow-up with orthopedics or podiatry if no significant improvement    Trial of Medrol Dosepak for pain    Tylenol as needed for pain    Ice to the area frequently

## 2025-01-27 ENCOUNTER — HOSPITAL ENCOUNTER (EMERGENCY)
Age: 50
Discharge: HOME | End: 2025-01-27
Payer: COMMERCIAL

## 2025-01-27 VITALS
DIASTOLIC BLOOD PRESSURE: 109 MMHG | RESPIRATION RATE: 18 BRPM | HEART RATE: 70 BPM | TEMPERATURE: 98.2 F | WEIGHT: 210 LBS | SYSTOLIC BLOOD PRESSURE: 151 MMHG | OXYGEN SATURATION: 97 % | BODY MASS INDEX: 32.89 KG/M2

## 2025-01-27 DIAGNOSIS — J02.9 ACUTE PHARYNGITIS, UNSPECIFIED ETIOLOGY: Primary | ICD-10-CM

## 2025-01-27 LAB
FLUAV AG SPEC QL: NEGATIVE
FLUBV AG SPEC QL: NEGATIVE
S PYO AG THROAT QL: NEGATIVE

## 2025-01-27 PROCEDURE — 99213 OFFICE O/P EST LOW 20 MIN: CPT

## 2025-01-27 PROCEDURE — 87651 STREP A DNA AMP PROBE: CPT

## 2025-01-27 PROCEDURE — 87804 INFLUENZA ASSAY W/OPTIC: CPT

## 2025-01-27 PROCEDURE — 99214 OFFICE O/P EST MOD 30 MIN: CPT | Performed by: NURSE PRACTITIONER

## 2025-01-27 RX ORDER — BENZONATATE 200 MG/1
200 CAPSULE ORAL 3 TIMES DAILY PRN
Qty: 21 CAPSULE | Refills: 0 | Status: SHIPPED | OUTPATIENT
Start: 2025-01-27 | End: 2025-02-03

## 2025-01-27 RX ORDER — ALBUTEROL SULFATE 90 UG/1
2 INHALANT RESPIRATORY (INHALATION) EVERY 6 HOURS PRN
Qty: 18 G | Refills: 0 | Status: SHIPPED | OUTPATIENT
Start: 2025-01-27

## 2025-01-27 RX ORDER — ONDANSETRON 4 MG/1
4 TABLET, FILM COATED ORAL EVERY 8 HOURS PRN
Qty: 9 TABLET | Refills: 0 | Status: SHIPPED | OUTPATIENT
Start: 2025-01-27 | End: 2025-01-30

## 2025-01-27 ASSESSMENT — ENCOUNTER SYMPTOMS
SHORTNESS OF BREATH: 0
CHOKING: 0
WHEEZING: 0
COUGH: 1
SORE THROAT: 1
STRIDOR: 0
SINUS PAIN: 1
SWOLLEN GLANDS: 0
APNEA: 0
CHEST TIGHTNESS: 1
RHINORRHEA: 1

## 2025-01-27 ASSESSMENT — PAIN SCALES - GENERAL: PAINLEVEL_OUTOF10: 7

## 2025-01-27 ASSESSMENT — PAIN DESCRIPTION - PAIN TYPE: TYPE: ACUTE PAIN

## 2025-01-27 ASSESSMENT — PAIN - FUNCTIONAL ASSESSMENT: PAIN_FUNCTIONAL_ASSESSMENT: 0-10

## 2025-01-27 ASSESSMENT — PAIN DESCRIPTION - DESCRIPTORS: DESCRIPTORS: ACHING

## 2025-01-27 ASSESSMENT — PAIN DESCRIPTION - LOCATION: LOCATION: HEAD;THROAT

## 2025-01-27 NOTE — ED NOTES
Pt ambulatory to [facility] with c/o cough, sore throat, nasal congestion, and generalized body aches since Friday. No other concerns noted. Pt swabbed for strep and flu.     Roxanna Geiger RN  01/27/25 [id number]

## 2025-01-27 NOTE — ED PROVIDER NOTES
lesions.      Pharynx: Uvula midline. No pharyngeal swelling, oropharyngeal exudate, posterior oropharyngeal erythema or uvula swelling.      Tonsils: No tonsillar exudate or tonsillar abscesses.   Eyes:      Conjunctiva/sclera: Conjunctivae normal.   Pulmonary:      Effort: Pulmonary effort is normal. No respiratory distress.      Breath sounds: Normal breath sounds. No stridor. No wheezing, rhonchi or rales.   Chest:      Chest wall: No tenderness.   Skin:     General: Skin is warm and dry.   Neurological:      General: No focal deficit present.      Mental Status: She is alert and oriented to person, place, and time.   Psychiatric:         Mood and Affect: Mood normal.         Behavior: Behavior normal.         DIAGNOSTIC RESULTS   Labs:  Results for orders placed or performed during the hospital encounter of 01/27/25   Rapid influenza A/B antigens    Specimen: Nasopharyngeal   Result Value Ref Range    Flu A Antigen Negative NEGATIVE    Influenza B Ag, EIA Negative NEGATIVE   Strep Screen Group A Throat   Result Value Ref Range    Rapid Strep A Screen NEGATIVE        IMAGING:  No orders to display     URGENT CARE COURSE:     Vitals:    01/27/25 0815 01/27/25 0820   BP:  (!) 151/109   Pulse: 70    Resp: 18    Temp: 98.2 °F (36.8 °C)    TempSrc: Oral    SpO2: 97%    Weight: 95.3 kg (210 lb)        Medications - No data to display  PROCEDURES:  None  FINAL IMPRESSION      1. Acute pharyngitis, unspecified etiology        DISPOSITION/PLAN   Decision To Discharge    Suggestions for symptom management that are available over the counter.   If you have questions regarding allergies or contraindications to use, please speak to the pharmacy staff or your family provider.    For fevers or pain: acetaminophen (Tylenol), ibuprofen (Motrin)  For dry cough: medications containing dextromethorphan, such as Delsym, Robitussin DM or Mucinex DM and medicated throat lozenges  For congestion or sinus pressure: decongestant nasal  sprays, such as Afrin (for up to 3 days), medications containing guaifenesin to help break up mucus, such as Mucinex or Robitussin, nasal steroid sprays, such as Flonase, Sensimist, Rhinocort or Nasonex and saline nasal sprays, neti pot or sinus rinse bottle  For runny nose, sneezing or watery/itchy eyes: less sedating antihistamines, such as loratidine (Claritin), fexofenadine (Allegra) or Cetirizine (Zyrtec) and antihistamine eye drops, such as ketotifen (Zaditor, Alaway) or olopatadine (Pataday)  For sore throat:  Chloraseptic throat spray or sore throat lozenges or  Mucinex Instasoothe Sore Throat & Pain Cherry Spray  If you have high blood pressure, a brand like Coricidin HBP may be an option.you should avoid medications containing pseudoephedrine or phenylephrine, such as Sudafed,  running a humidifier in your bedroom may be helpful for many of your symptoms.  If your cough is keeping you awake at night, you can try raising your head with an extra pillow.  If the skin around your nose and lips becomes sore, you can put some petroleum jelly on the area.      Suggestions for over-the-counter supplements to help your immune system, if you have questions regarding allergies or contraindications to use, please speak to the pharmacy staff or your family provider.    Multi-vitamin/multi-mineral daily   Vitamin D3 3000 IU daily  Zinc 30 mg daily  Vitamin C 1000 mg twice daily  B-100 complex as daily as directed on bottle  Probiotic/Prebiotic gaston  Gargle with mouthwash 3 times daily, may use Scope, Crest, or Listerine.    COLD-EEZE  over the counter: Use as directed on package. Zinc gluconate lozenges  are used to help make cold symptoms less severe or shorter in duration. This includes sore throat, cough, sneezing, stuffy nose, and a hoarse voice.    Adequate nutrition, hydration, and rest are also important to good health and recovery.       REFERRED TO:  Danny Worthington MD  [facility]

## 2025-01-27 NOTE — DISCHARGE INSTRUCTIONS
Suggestions for symptom management that are available over the counter.   If you have questions regarding allergies or contraindications to use, please speak to the pharmacy staff or your family provider.    For fevers or pain: acetaminophen (Tylenol), ibuprofen (Motrin)  For dry cough: medications containing dextromethorphan, such as Delsym, Robitussin DM or Mucinex DM and medicated throat lozenges  For congestion or sinus pressure: decongestant nasal sprays, such as Afrin (for up to 3 days), medications containing guaifenesin to help break up mucus, such as Mucinex or Robitussin, nasal steroid sprays, such as Flonase, Sensimist, Rhinocort or Nasonex and saline nasal sprays, neti pot or sinus rinse bottle  For runny nose, sneezing or watery/itchy eyes: less sedating antihistamines, such as loratidine (Claritin), fexofenadine (Allegra) or Cetirizine (Zyrtec) and antihistamine eye drops, such as ketotifen (Zaditor, Alaway) or olopatadine (Pataday)  For sore throat:  Chloraseptic throat spray or sore throat lozenges or  Mucinex Instasoothe Sore Throat & Pain Cherry Spray  If you have high blood pressure, a brand like Coricidin HBP may be an option.you should avoid medications containing pseudoephedrine or phenylephrine, such as Sudafed,  running a humidifier in your bedroom may be helpful for many of your symptoms.  If your cough is keeping you awake at night, you can try raising your head with an extra pillow.  If the skin around your nose and lips becomes sore, you can put some petroleum jelly on the area.      Suggestions for over-the-counter supplements to help your immune system, if you have questions regarding allergies or contraindications to use, please speak to the pharmacy staff or your family provider.    Multi-vitamin/multi-mineral daily   Vitamin D3 3000 IU daily  Zinc 30 mg daily  Vitamin C 1000 mg twice daily  B-100 complex as daily as directed on bottle  Probiotic/Prebiotic gaston  Gargle with

## 2025-02-05 ENCOUNTER — HOSPITAL ENCOUNTER (EMERGENCY)
Age: 50
Discharge: HOME | End: 2025-02-05
Payer: COMMERCIAL

## 2025-02-05 ENCOUNTER — APPOINTMENT (OUTPATIENT)
Dept: GENERAL RADIOLOGY | Age: 50
End: 2025-02-05
Payer: COMMERCIAL

## 2025-02-05 VITALS
DIASTOLIC BLOOD PRESSURE: 85 MMHG | RESPIRATION RATE: 17 BRPM | TEMPERATURE: 98.5 F | WEIGHT: 210 LBS | HEIGHT: 67 IN | SYSTOLIC BLOOD PRESSURE: 135 MMHG | BODY MASS INDEX: 32.96 KG/M2 | HEART RATE: 73 BPM | OXYGEN SATURATION: 98 %

## 2025-02-05 DIAGNOSIS — J11.1 INFLUENZA: Primary | ICD-10-CM

## 2025-02-05 LAB
FLUAV RNA RESP QL NAA+PROBE: NOT DETECTED
FLUBV RNA RESP QL NAA+PROBE: NOT DETECTED
SARS-COV-2 RNA RESP QL NAA+PROBE: NOT DETECTED

## 2025-02-05 PROCEDURE — 71046 X-RAY EXAM CHEST 2 VIEWS: CPT

## 2025-02-05 PROCEDURE — 87636 SARSCOV2 & INF A&B AMP PRB: CPT

## 2025-02-05 PROCEDURE — 99284 EMERGENCY DEPT VISIT MOD MDM: CPT

## 2025-02-05 RX ORDER — BENZONATATE 200 MG/1
200 CAPSULE ORAL 3 TIMES DAILY PRN
Qty: 30 CAPSULE | Refills: 0 | Status: SHIPPED | OUTPATIENT
Start: 2025-02-05 | End: 2025-02-15

## 2025-02-05 NOTE — ED PROVIDER NOTES
[facility] EMERGENCY DEPARTMENT      EMERGENCY MEDICINE     Pt Name: Tess Marley  MRN: [id number]  Birthdate 1975  Date of evaluation: 2/5/2025  Provider: RANDY Johnson    CHIEF COMPLAINT       Chief Complaint   Patient presents with    Fatigue    Cough     HISTORY OF PRESENT ILLNESS   Tess Marley is a pleasant 49 y.o. female who presents to the emergency department from home complaining of cough, body aches, runny nose, patient has been ill for about 10 days.  Was checked last week and tested negative for influenza and strep.  However since then a family member has tested positive for influenza A.  Fatigue, lack of energy.  No chest pain or shortness of breath.  No abdominal pain.    History of breast cancer.    No current chemotherapy.  PASTMEDICAL HISTORY     Past Medical History:   Diagnosis Date    Anxiety     Cancer (HCC)     Migraine        Patient Active Problem List   Diagnosis Code    Breast cancer metastasized to axillary lymph node, left (HCC) C50.912, C77.3     SURGICAL HISTORY       Past Surgical History:   Procedure Laterality Date    BREAST SURGERY      ENDOMETRIAL ABLATION  2019    SALPINGO-OOPHORECTOMY N/A 10/17/2024    LAPAROSCOPIC BILATERAL SALPINGO- OOPHORECTOMY performed by Addie Peterson MD at [facility] OR    TONSILLECTOMY      TUBAL LIGATION         CURRENT MEDICATIONS       Previous Medications    ALBUTEROL SULFATE HFA (PROVENTIL;VENTOLIN;PROAIR) 108 (90 BASE) MCG/ACT INHALER    Inhale 2 puffs into the lungs every 6 hours as needed for Wheezing    AMLODIPINE (NORVASC) 5 MG TABLET    Take 1 tablet by mouth daily    LETROZOLE (FEMARA) 2.5 MG TABLET    Take 1 tablet by mouth daily    TRAZODONE (DESYREL) 50 MG TABLET    Take 1 tablet by mouth nightly as needed    VENLAFAXINE (EFFEXOR XR) 37.5 MG EXTENDED RELEASE CAPSULE    TAKE 1 CAPSULE BY MOUTH DAILY FOR 7 DAYS THEN TAKE 2 CAPSULES BY MOUTH DAILY       ALLERGIES     has No Known Allergies.    FAMILY HISTORY     She

## 2025-02-05 NOTE — ED NOTES
Pt presents to the urgent care with complaints of generalized fatigue. Pt states she was sick for two weeks but now its worse. Pt states she is coughing up greenish mucous. Pt pulse ox 9%. Pt respirations are even and unlabored.

## 2025-02-05 NOTE — DISCHARGE INSTRUCTIONS
Drink plenty of fluids.  Make a follow-up appoint with your regular physician for recheck.    Discharge warning    Please remember that examination and testing performed in the emergency department is not a comprehensive evaluation of all medical conditions and does not replace the need to follow up with your primary care provider.  In the emergency department, we are only able to evaluate your symptoms in the current condition, but symptoms may change or worsen.  Although you are felt safe to be discharged today, if your symptoms persist or change, you need to be re-evaluated by your regular/primary care doctor as soon as possible.  If you are unable to make appointment with your regular doctor, please come back to the ER to be re-evaluated.

## 2025-02-11 ENCOUNTER — HOSPITAL ENCOUNTER (EMERGENCY)
Age: 50
Discharge: HOME | End: 2025-02-11
Attending: EMERGENCY MEDICINE
Payer: COMMERCIAL

## 2025-02-11 ENCOUNTER — APPOINTMENT (OUTPATIENT)
Dept: GENERAL RADIOLOGY | Age: 50
End: 2025-02-11
Payer: COMMERCIAL

## 2025-02-11 VITALS
RESPIRATION RATE: 18 BRPM | HEART RATE: 71 BPM | DIASTOLIC BLOOD PRESSURE: 70 MMHG | OXYGEN SATURATION: 100 % | TEMPERATURE: 99.1 F | SYSTOLIC BLOOD PRESSURE: 128 MMHG

## 2025-02-11 DIAGNOSIS — J06.9 VIRAL URI WITH COUGH: Primary | ICD-10-CM

## 2025-02-11 LAB
ANION GAP SERPL CALC-SCNC: 13 MEQ/L (ref 8–16)
BASOPHILS ABSOLUTE: 0 THOU/MM3 (ref 0–0.1)
BASOPHILS NFR BLD AUTO: 0.8 %
BUN SERPL-MCNC: 18 MG/DL (ref 7–22)
CALCIUM SERPL-MCNC: 9.1 MG/DL (ref 8.5–10.5)
CHLORIDE SERPL-SCNC: 103 MEQ/L (ref 98–111)
CO2 SERPL-SCNC: 24 MEQ/L (ref 23–33)
CREAT SERPL-MCNC: 0.9 MG/DL (ref 0.4–1.2)
DEPRECATED RDW RBC AUTO: 44.1 FL (ref 35–45)
EOSINOPHIL NFR BLD AUTO: 0.4 %
EOSINOPHILS ABSOLUTE: 0 THOU/MM3 (ref 0–0.4)
ERYTHROCYTE [DISTWIDTH] IN BLOOD BY AUTOMATED COUNT: 12.5 % (ref 11.5–14.5)
FLUAV RNA RESP QL NAA+PROBE: NOT DETECTED
FLUBV RNA RESP QL NAA+PROBE: NOT DETECTED
GFR SERPL CREATININE-BSD FRML MDRD: 77 ML/MIN/1.73M2
GLUCOSE SERPL-MCNC: 95 MG/DL (ref 70–108)
HCT VFR BLD AUTO: 40.6 % (ref 37–47)
HGB BLD-MCNC: 13 GM/DL (ref 12–16)
IMM GRANULOCYTES # BLD AUTO: 0.01 THOU/MM3 (ref 0–0.07)
IMM GRANULOCYTES NFR BLD AUTO: 0.4 %
LYMPHOCYTES ABSOLUTE: 1.3 THOU/MM3 (ref 1–4.8)
LYMPHOCYTES NFR BLD AUTO: 46.8 %
MCH RBC QN AUTO: 31 PG (ref 26–33)
MCHC RBC AUTO-ENTMCNC: 32 GM/DL (ref 32.2–35.5)
MCV RBC AUTO: 96.9 FL (ref 81–99)
MONOCYTES ABSOLUTE: 0.3 THOU/MM3 (ref 0.4–1.3)
MONOCYTES NFR BLD AUTO: 10.6 %
NEUTROPHILS ABSOLUTE: 1.1 THOU/MM3 (ref 1.8–7.7)
NEUTROPHILS NFR BLD AUTO: 41 %
NRBC BLD AUTO-RTO: 0 /100 WBC
OSMOLALITY SERPL CALC.SUM OF ELEC: 281.1 MOSMOL/KG (ref 275–300)
PLATELET # BLD AUTO: 169 THOU/MM3 (ref 130–400)
PMV BLD AUTO: 9.6 FL (ref 9.4–12.4)
POTASSIUM SERPL-SCNC: 3.8 MEQ/L (ref 3.5–5.2)
RBC # BLD AUTO: 4.19 MILL/MM3 (ref 4.2–5.4)
SARS-COV-2 RNA RESP QL NAA+PROBE: NOT DETECTED
SODIUM SERPL-SCNC: 140 MEQ/L (ref 135–145)
WBC # BLD AUTO: 2.7 THOU/MM3 (ref 4.8–10.8)

## 2025-02-11 PROCEDURE — 71046 X-RAY EXAM CHEST 2 VIEWS: CPT

## 2025-02-11 PROCEDURE — 80048 BASIC METABOLIC PNL TOTAL CA: CPT

## 2025-02-11 PROCEDURE — 85025 COMPLETE CBC W/AUTO DIFF WBC: CPT

## 2025-02-11 PROCEDURE — 94640 AIRWAY INHALATION TREATMENT: CPT

## 2025-02-11 PROCEDURE — 6370000000 HC RX 637 (ALT 250 FOR IP)

## 2025-02-11 PROCEDURE — 36415 COLL VENOUS BLD VENIPUNCTURE: CPT

## 2025-02-11 PROCEDURE — 94760 N-INVAS EAR/PLS OXIMETRY 1: CPT

## 2025-02-11 PROCEDURE — 87636 SARSCOV2 & INF A&B AMP PRB: CPT

## 2025-02-11 PROCEDURE — 99284 EMERGENCY DEPT VISIT MOD MDM: CPT

## 2025-02-11 RX ORDER — ALBUTEROL SULFATE 90 UG/1
2 INHALANT RESPIRATORY (INHALATION) 4 TIMES DAILY PRN
Qty: 54 G | Refills: 1 | Status: SHIPPED | OUTPATIENT
Start: 2025-02-11

## 2025-02-11 RX ORDER — IPRATROPIUM BROMIDE AND ALBUTEROL SULFATE 2.5; .5 MG/3ML; MG/3ML
2 SOLUTION RESPIRATORY (INHALATION) ONCE
Status: COMPLETED | OUTPATIENT
Start: 2025-02-11 | End: 2025-02-11

## 2025-02-11 RX ORDER — BENZONATATE 200 MG/1
200 CAPSULE ORAL 3 TIMES DAILY PRN
Qty: 30 CAPSULE | Refills: 0 | Status: SHIPPED | OUTPATIENT
Start: 2025-02-11 | End: 2025-02-21

## 2025-02-11 RX ADMIN — IPRATROPIUM BROMIDE AND ALBUTEROL SULFATE 2 DOSE: .5; 3 SOLUTION RESPIRATORY (INHALATION) at 11:31

## 2025-02-11 ASSESSMENT — PAIN DESCRIPTION - FREQUENCY: FREQUENCY: CONTINUOUS

## 2025-02-11 ASSESSMENT — PAIN SCALES - GENERAL: PAINLEVEL_OUTOF10: 7

## 2025-02-11 ASSESSMENT — PAIN - FUNCTIONAL ASSESSMENT: PAIN_FUNCTIONAL_ASSESSMENT: 0-10

## 2025-02-11 ASSESSMENT — PAIN DESCRIPTION - PAIN TYPE: TYPE: ACUTE PAIN

## 2025-02-11 ASSESSMENT — PAIN DESCRIPTION - DESCRIPTORS: DESCRIPTORS: ACHING

## 2025-02-11 ASSESSMENT — PAIN DESCRIPTION - ONSET: ONSET: ON-GOING

## 2025-02-11 ASSESSMENT — PAIN DESCRIPTION - LOCATION: LOCATION: HEAD

## 2025-02-11 NOTE — ED NOTES
Pt presents to the ER for a cough and cold like symptoms. Pt states that she was here approx a week ago with her family and they tested + for flu A and she tested negative. They have all had the same symptoms and she states that she is not getting any better. Pt states that her cough is horrible and now her stomach hurts due to constantly coughing.

## 2025-02-11 NOTE — ED PROVIDER NOTES
(TESSALON) 200 MG capsule, Take 1 capsule by mouth 3 times daily as needed for Cough, Disp: 30 capsule, Rfl: 0    traZODone (DESYREL) 50 MG tablet, Take 1 tablet by mouth nightly as needed, Disp: , Rfl:     venlafaxine (EFFEXOR XR) 37.5 MG extended release capsule, TAKE 1 CAPSULE BY MOUTH DAILY FOR 7 DAYS THEN TAKE 2 CAPSULES BY MOUTH DAILY, Disp: , Rfl:     amLODIPine (NORVASC) 5 MG tablet, Take 1 tablet by mouth daily, Disp: 90 tablet, Rfl: 3    letrozole (FEMARA) 2.5 MG tablet, Take 1 tablet by mouth daily, Disp: , Rfl:     Previous Medications    AMLODIPINE (NORVASC) 5 MG TABLET    Take 1 tablet by mouth daily    LETROZOLE (FEMARA) 2.5 MG TABLET    Take 1 tablet by mouth daily    TRAZODONE (DESYREL) 50 MG TABLET    Take 1 tablet by mouth nightly as needed    VENLAFAXINE (EFFEXOR XR) 37.5 MG EXTENDED RELEASE CAPSULE    TAKE 1 CAPSULE BY MOUTH DAILY FOR 7 DAYS THEN TAKE 2 CAPSULES BY MOUTH DAILY         SOCIAL HISTORY     Social History     Social History Narrative    Not on file     Social History     Tobacco Use    Smoking status: Never    Smokeless tobacco: Never   Vaping Use    Vaping status: Never Used   Substance Use Topics    Alcohol use: Not Currently     Comment: socially    Drug use: No         ALLERGIES   No Known Allergies      FAMILY HISTORY     Family History   Problem Relation Age of Onset    Cancer Mother         Cervical    Cancer Paternal Aunt         Lung    Cancer Paternal Uncle         Kidney    Cancer Maternal Grandmother         Lymphoma    Breast Cancer Paternal Grandmother          PHYSICAL EXAM     ED Triage Vitals [02/11/25 1046]   BP Systolic BP Percentile Diastolic BP Percentile Temp Temp Source Pulse Respirations SpO2   128/70 -- -- 99.1 °F (37.3 °C) Oral 71 18 100 %      Height Weight         -- --           Initial vital signs and nursing assessment reviewed. There is no height or weight on file to calculate BMI.     Additional Vital Signs:  Vitals:    02/11/25 1046   BP: 128/70

## 2025-02-11 NOTE — DISCHARGE INSTR - COC
Continuity of Care Form    Patient Name: Tess Marley   :  1975  MRN:  [id number]    Admit date:  2025  Discharge date:  ***    Code Status Order: Prior   Advance Directives:   Advance Care Flowsheet Documentation             Admitting Physician:  No admitting provider for patient encounter.  PCP: Danny Worthington MD    Discharging Nurse: ***  Discharging Hospital Unit/Room#: CLARK R/CLARK R  Discharging Unit Phone Number: ***    Emergency Contact:   Extended Emergency Contact Information  Primary Emergency Contact: Nadeem Vaughn   [facility]  Home Phone: [phone]  Relation: Domestic Partner  Secondary Emergency Contact: Paula Connors   [facility]  Home Phone: [phone]  Relation: Parent    Past Surgical History:  Past Surgical History:   Procedure Laterality Date    BREAST SURGERY      ENDOMETRIAL ABLATION  2019    SALPINGO-OOPHORECTOMY N/A 10/17/2024    LAPAROSCOPIC BILATERAL SALPINGO- OOPHORECTOMY performed by Addie Peterson MD at [facility] OR    TONSILLECTOMY      TUBAL LIGATION         Immunization History:     There is no immunization history on file for this patient.    Active Problems:  Patient Active Problem List   Diagnosis Code    Breast cancer metastasized to axillary lymph node, left (HCC) C50.912, C77.3       Isolation/Infection:   Isolation            No Isolation          Patient Infection Status       None to display                     Nurse Assessment:  Last Vital Signs: /70   Pulse 71   Temp 99.1 °F (37.3 °C) (Oral)   Resp 18   LMP 2015   SpO2 100%     Last documented pain score (0-10 scale): Pain Level: 7  Last Weight:   Wt Readings from Last 1 Encounters:   25 95.3 kg (210 lb)     Mental Status:  {IP PT MENTAL STATUS:}    IV Access:  { CLEO IV ACCESS:203943751}    Nursing Mobility/ADLs:  Walking   {CHP DME ADLs:434387224}  Transfer  {P DME ADLs:199924385}  Bathing  {CHP DME ADLs:413318812}  Dressing  {CHP DME

## 2025-02-12 ENCOUNTER — TRANSCRIBE ORDERS (OUTPATIENT)
Dept: ADMINISTRATIVE | Age: 50
End: 2025-02-12

## 2025-02-12 DIAGNOSIS — R05.1 ACUTE COUGH: Primary | ICD-10-CM

## 2025-02-12 DIAGNOSIS — C50.412 MALIGNANT NEOPLASM OF UPPER-OUTER QUADRANT OF LEFT BREAST IN FEMALE, ESTROGEN RECEPTOR POSITIVE (HCC): ICD-10-CM

## 2025-02-12 DIAGNOSIS — Z17.0 MALIGNANT NEOPLASM OF UPPER-OUTER QUADRANT OF LEFT BREAST IN FEMALE, ESTROGEN RECEPTOR POSITIVE (HCC): ICD-10-CM

## 2025-02-14 ENCOUNTER — HOSPITAL ENCOUNTER (OUTPATIENT)
Dept: CT IMAGING | Age: 50
Discharge: HOME | End: 2025-02-14
Payer: COMMERCIAL

## 2025-02-14 DIAGNOSIS — C50.412 MALIGNANT NEOPLASM OF UPPER-OUTER QUADRANT OF LEFT BREAST IN FEMALE, ESTROGEN RECEPTOR POSITIVE (HCC): ICD-10-CM

## 2025-02-14 DIAGNOSIS — R05.1 ACUTE COUGH: ICD-10-CM

## 2025-02-14 DIAGNOSIS — Z17.0 MALIGNANT NEOPLASM OF UPPER-OUTER QUADRANT OF LEFT BREAST IN FEMALE, ESTROGEN RECEPTOR POSITIVE (HCC): ICD-10-CM

## 2025-02-14 PROCEDURE — 71250 CT THORAX DX C-: CPT

## 2025-02-28 ENCOUNTER — HOSPITAL ENCOUNTER (EMERGENCY)
Age: 50
Discharge: HOME | End: 2025-02-28
Payer: COMMERCIAL

## 2025-02-28 VITALS
OXYGEN SATURATION: 98 % | HEART RATE: 76 BPM | RESPIRATION RATE: 20 BRPM | TEMPERATURE: 97.1 F | SYSTOLIC BLOOD PRESSURE: 131 MMHG | DIASTOLIC BLOOD PRESSURE: 91 MMHG

## 2025-02-28 DIAGNOSIS — J06.9 ACUTE UPPER RESPIRATORY INFECTION: ICD-10-CM

## 2025-02-28 DIAGNOSIS — R07.81 RIB PAIN ON LEFT SIDE: Primary | ICD-10-CM

## 2025-02-28 PROCEDURE — 99213 OFFICE O/P EST LOW 20 MIN: CPT

## 2025-02-28 RX ORDER — AZITHROMYCIN 250 MG/1
TABLET, FILM COATED ORAL
Qty: 6 TABLET | Refills: 0 | Status: SHIPPED | OUTPATIENT
Start: 2025-02-28 | End: 2025-03-10

## 2025-02-28 RX ORDER — BENZONATATE 200 MG/1
200 CAPSULE ORAL 3 TIMES DAILY PRN
Qty: 30 CAPSULE | Refills: 0 | Status: SHIPPED | OUTPATIENT
Start: 2025-02-28 | End: 2025-03-10

## 2025-02-28 ASSESSMENT — ENCOUNTER SYMPTOMS
VOMITING: 0
NAUSEA: 0
GASTROINTESTINAL NEGATIVE: 1
DIARRHEA: 0
EYES NEGATIVE: 1
ALLERGIC/IMMUNOLOGIC NEGATIVE: 1
COUGH: 1

## 2025-02-28 NOTE — DISCHARGE INSTRUCTIONS
Medications as prescribed.  Increase fluid intake.  Can take over-the-counter Motrin or Tylenol as needed for pain  Follow-up with family doctor in 3 days.  Go to the emergency room for any new or worsening symptoms.

## 2025-02-28 NOTE — ED TRIAGE NOTES
Pt to uc with c/o ongoing cough and new left sided rib pain from coughing. Pt just finished atb and steroid for pneumonia that was found on a ct scan. Pt reports feeling better until last night when the cough returned and now she has side pain.         levoFLOXacin 750 MG tablet New  Add as: levoFLOXacin (LEVAQUIN) 750 MG tablet Dose: 750 mg Take 1 tablet by mouth daily for 7 days.  02/21/2025 02/28/2025 1 Outside Source, External Pharmacy 02/21/2025        Dispense Date: 02/21/2025; Qty: 7 each

## 2025-02-28 NOTE — ED PROVIDER NOTES
[facility] URGENT CARE  Urgent Care Encounter       CHIEF COMPLAINT       Chief Complaint   Patient presents with   • Cough   • Chest Pain     Left sided rib pain          Nurses Notes reviewed and I agree except as noted in the HPI.  HISTORY OF PRESENT ILLNESS   Tess Marley is a 49 y.o. female who presents to urgent care for cough, left-sided rib pain.  Symptoms started one day ago.  States she just finished Levaquin and a steroid and was feeling better until 1 day ago.  Denies fever, nausea, vomiting and diarrhea.  Pain worse with coughing and deep breathing.    The history is provided by the patient. No  was used.       REVIEW OF SYSTEMS     Review of Systems   Constitutional:  Negative for fever.   HENT: Negative.     Eyes: Negative.    Respiratory:  Positive for cough.    Cardiovascular: Negative.    Gastrointestinal: Negative.  Negative for diarrhea, nausea and vomiting.   Endocrine: Negative.    Genitourinary: Negative.    Musculoskeletal: Negative.    Skin: Negative.    Allergic/Immunologic: Negative.    Neurological: Negative.    Hematological: Negative.    Psychiatric/Behavioral: Negative.         PAST MEDICAL HISTORY         Diagnosis Date   • Anxiety    • Cancer (HCC)    • Migraine        SURGICALHISTORY     Patient  has a past surgical history that includes Tonsillectomy; Tubal ligation; Breast surgery; Endometrial ablation (2019); and Salpingo-oophorectomy (N/A, 10/17/2024).    CURRENT MEDICATIONS       Discharge Medication List as of 2/28/2025 11:22 AM        CONTINUE these medications which have NOT CHANGED    Details   albuterol sulfate HFA (VENTOLIN HFA) 108 (90 Base) MCG/ACT inhaler Inhale 2 puffs into the lungs 4 times daily as needed for Wheezing, Disp-54 g, R-1Normal      traZODone (DESYREL) 50 MG tablet Take 1 tablet by mouth nightly as neededHistorical Med      venlafaxine (EFFEXOR XR) 37.5 MG extended release capsule TAKE 1 CAPSULE BY MOUTH DAILY FOR 7 DAYS

## 2025-03-09 ENCOUNTER — HOSPITAL ENCOUNTER (EMERGENCY)
Age: 50
Discharge: HOME | End: 2025-03-09
Payer: COMMERCIAL

## 2025-03-09 ENCOUNTER — APPOINTMENT (OUTPATIENT)
Dept: GENERAL RADIOLOGY | Age: 50
End: 2025-03-09
Payer: COMMERCIAL

## 2025-03-09 VITALS
HEART RATE: 85 BPM | RESPIRATION RATE: 16 BRPM | OXYGEN SATURATION: 96 % | DIASTOLIC BLOOD PRESSURE: 95 MMHG | BODY MASS INDEX: 31.32 KG/M2 | SYSTOLIC BLOOD PRESSURE: 142 MMHG | WEIGHT: 200 LBS | TEMPERATURE: 98.4 F

## 2025-03-09 DIAGNOSIS — M25.562 ACUTE PAIN OF BOTH KNEES: Primary | ICD-10-CM

## 2025-03-09 DIAGNOSIS — M25.561 ACUTE PAIN OF BOTH KNEES: Primary | ICD-10-CM

## 2025-03-09 PROCEDURE — 99283 EMERGENCY DEPT VISIT LOW MDM: CPT

## 2025-03-09 PROCEDURE — 73564 X-RAY EXAM KNEE 4 OR MORE: CPT

## 2025-03-09 PROCEDURE — 6370000000 HC RX 637 (ALT 250 FOR IP): Performed by: PHYSICIAN ASSISTANT

## 2025-03-09 RX ORDER — TRAMADOL HYDROCHLORIDE 50 MG/1
50 TABLET ORAL EVERY 6 HOURS PRN
Qty: 12 TABLET | Refills: 0 | Status: SHIPPED | OUTPATIENT
Start: 2025-03-09 | End: 2025-03-12

## 2025-03-09 RX ORDER — TRAMADOL HYDROCHLORIDE 50 MG/1
50 TABLET ORAL ONCE
Status: COMPLETED | OUTPATIENT
Start: 2025-03-09 | End: 2025-03-09

## 2025-03-09 RX ADMIN — TRAMADOL HYDROCHLORIDE 50 MG: 50 TABLET ORAL at 21:02

## 2025-03-09 ASSESSMENT — PAIN SCALES - GENERAL
PAINLEVEL_OUTOF10: 10
PAINLEVEL_OUTOF10: 8

## 2025-03-09 ASSESSMENT — PAIN - FUNCTIONAL ASSESSMENT
PAIN_FUNCTIONAL_ASSESSMENT: 0-10
PAIN_FUNCTIONAL_ASSESSMENT: 0-10

## 2025-03-09 NOTE — ED NOTES
This RN assumed care of pt. Pt resting on cot reporting pain in both knees expanding into legs. 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 Pt has tried , Tylenol and flexeril without relief for pain.

## 2025-03-09 NOTE — ED TRIAGE NOTES
Patient presents to ED with chief complaint of bilateral knee pain and swelling. Patient states that it has been going on for over a week and the pain is now a 10/10. Patient is also noticing swelling at the ankles as well. Patient resting in bed. Respirations easy and unlabored. No distress noted. Call light within reach.

## 2025-03-10 NOTE — ED PROVIDER NOTES
[facility] EMERGENCY DEPT      Pt Name: Tess Marley  MRN: [id number]  Birthdate 1975  Date of evaluation: 3/9/2025  Provider: Malorie Olson PA-C    CHIEF COMPLAINT       Chief Complaint   Patient presents with    Knee Pain     Bilateral       Nurses Notes reviewed and I agree except as noted in the HPI.      HISTORY OF PRESENT ILLNESS    Tess Marley is a 49 y.o. female who presents through the lobby from home for bilateral knee pain.  Patient reports developing knee pain on 3-5.  It is both knees but left is worse than right.  Patient reports she wakes up in the morning with only minor pain but the end of the workday she can barely walk.  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  She reports no injury or new activity that may have caused the pain.  Her pain is worse with bending and walking and improves mildly with Tylenol, ibuprofen, and Flexeril.  Patient does not want to miss work and she cannot tolerate the pain any longer.  Today she has been laying with a pillow beneath her knees causing her feet to intermittently go numb.  Her knee swelling is not bad today because she has been off her feet most of the day.  Sometimes when patient walks she feels her knees may give out.  Patient denies persistent numbness, inability to move her lower extremities, redness, fever, chills, nausea, vomiting, change in appetite, chest pain, dyspnea, calf swelling, or other complaints.    Location/Symptom: Bilateral knee pain left worse than right  Timing/Onset: 3-5  Context/Setting: Spontaneous onset with no injury or overuse syndrome  Quality: Pain, aching  Duration: Constant  Modifying Factors: Worsened with bending and walking; improves mildly with Tylenol, ibuprofen, and Flexeril  Severity: Moderate    PAST MEDICAL HISTORY    has a past medical history of Anxiety, Cancer (HCC), and Migraine.    SURGICAL HISTORY      has

## 2025-03-16 ENCOUNTER — TRANSCRIBE ORDERS (OUTPATIENT)
Dept: ADMINISTRATIVE | Age: 50
End: 2025-03-16

## 2025-03-16 DIAGNOSIS — M23.92 INTERNAL DERANGEMENT OF LEFT KNEE: Primary | ICD-10-CM

## 2025-04-04 ENCOUNTER — HOSPITAL ENCOUNTER (OUTPATIENT)
Dept: MRI IMAGING | Age: 50
Discharge: HOME | End: 2025-04-04
Attending: STUDENT IN AN ORGANIZED HEALTH CARE EDUCATION/TRAINING PROGRAM
Payer: COMMERCIAL

## 2025-04-04 DIAGNOSIS — M23.92 INTERNAL DERANGEMENT OF LEFT KNEE: ICD-10-CM

## 2025-04-04 PROCEDURE — 73721 MRI JNT OF LWR EXTRE W/O DYE: CPT

## 2025-04-27 ENCOUNTER — HOSPITAL ENCOUNTER (EMERGENCY)
Age: 50
Discharge: HOME | End: 2025-04-27
Attending: EMERGENCY MEDICINE
Payer: COMMERCIAL

## 2025-04-27 ENCOUNTER — APPOINTMENT (OUTPATIENT)
Dept: CT IMAGING | Age: 50
End: 2025-04-27
Payer: COMMERCIAL

## 2025-04-27 VITALS
DIASTOLIC BLOOD PRESSURE: 91 MMHG | BODY MASS INDEX: 31.32 KG/M2 | OXYGEN SATURATION: 99 % | TEMPERATURE: 98 F | RESPIRATION RATE: 12 BRPM | WEIGHT: 200 LBS | SYSTOLIC BLOOD PRESSURE: 161 MMHG | HEART RATE: 67 BPM

## 2025-04-27 DIAGNOSIS — M54.2 NECK PAIN: Primary | ICD-10-CM

## 2025-04-27 LAB
ANION GAP SERPL CALC-SCNC: 10 MEQ/L (ref 8–16)
BASOPHILS ABSOLUTE: 0.1 THOU/MM3 (ref 0–0.1)
BASOPHILS NFR BLD AUTO: 0.8 %
BUN SERPL-MCNC: 17 MG/DL (ref 8–23)
CALCIUM SERPL-MCNC: 9.3 MG/DL (ref 8.6–10)
CHLORIDE SERPL-SCNC: 106 MEQ/L (ref 98–111)
CO2 SERPL-SCNC: 25 MEQ/L (ref 22–29)
CREAT SERPL-MCNC: 0.9 MG/DL (ref 0.5–0.9)
DEPRECATED RDW RBC AUTO: 45.4 FL (ref 35–45)
EOSINOPHIL NFR BLD AUTO: 1.5 %
EOSINOPHILS ABSOLUTE: 0.1 THOU/MM3 (ref 0–0.4)
ERYTHROCYTE [DISTWIDTH] IN BLOOD BY AUTOMATED COUNT: 13.5 % (ref 11.5–14.5)
GFR SERPL CREATININE-BSD FRML MDRD: 77 ML/MIN/1.73M2
GLUCOSE SERPL-MCNC: 111 MG/DL (ref 74–109)
HCT VFR BLD AUTO: 37.4 % (ref 37–47)
HGB BLD-MCNC: 12.5 GM/DL (ref 12–16)
IMM GRANULOCYTES # BLD AUTO: 0.02 THOU/MM3 (ref 0–0.07)
IMM GRANULOCYTES NFR BLD AUTO: 0.3 %
LYMPHOCYTES ABSOLUTE: 1.3 THOU/MM3 (ref 1–4.8)
LYMPHOCYTES NFR BLD AUTO: 18 %
MCH RBC QN AUTO: 30.6 PG (ref 26–33)
MCHC RBC AUTO-ENTMCNC: 33.4 GM/DL (ref 32.2–35.5)
MCV RBC AUTO: 91.4 FL (ref 81–99)
MONOCYTES ABSOLUTE: 0.5 THOU/MM3 (ref 0.4–1.3)
MONOCYTES NFR BLD AUTO: 7 %
NEUTROPHILS ABSOLUTE: 5.4 THOU/MM3 (ref 1.8–7.7)
NEUTROPHILS NFR BLD AUTO: 72.4 %
NRBC BLD AUTO-RTO: 0 /100 WBC
OSMOLALITY SERPL CALC.SUM OF ELEC: 283.5 MOSMOL/KG (ref 275–300)
PLATELET # BLD AUTO: 278 THOU/MM3 (ref 130–400)
PMV BLD AUTO: 9.2 FL (ref 9.4–12.4)
POTASSIUM SERPL-SCNC: 3.9 MEQ/L (ref 3.5–5.2)
RBC # BLD AUTO: 4.09 MILL/MM3 (ref 4.2–5.4)
SODIUM SERPL-SCNC: 141 MEQ/L (ref 135–145)
WBC # BLD AUTO: 7.4 THOU/MM3 (ref 4.8–10.8)

## 2025-04-27 PROCEDURE — 85025 COMPLETE CBC W/AUTO DIFF WBC: CPT

## 2025-04-27 PROCEDURE — 80048 BASIC METABOLIC PNL TOTAL CA: CPT

## 2025-04-27 PROCEDURE — 70450 CT HEAD/BRAIN W/O DYE: CPT

## 2025-04-27 PROCEDURE — 99285 EMERGENCY DEPT VISIT HI MDM: CPT

## 2025-04-27 PROCEDURE — 70496 CT ANGIOGRAPHY HEAD: CPT

## 2025-04-27 PROCEDURE — 36415 COLL VENOUS BLD VENIPUNCTURE: CPT

## 2025-04-27 PROCEDURE — 6360000004 HC RX CONTRAST MEDICATION

## 2025-04-27 PROCEDURE — 70498 CT ANGIOGRAPHY NECK: CPT

## 2025-04-27 RX ORDER — IOPAMIDOL 755 MG/ML
100 INJECTION, SOLUTION INTRAVASCULAR
Status: COMPLETED | OUTPATIENT
Start: 2025-04-27 | End: 2025-04-27

## 2025-04-27 RX ORDER — ABEMACICLIB 50 MG/1
50 TABLET ORAL 2 TIMES DAILY
COMMUNITY

## 2025-04-27 RX ADMIN — IOPAMIDOL 100 ML: 755 INJECTION, SOLUTION INTRAVENOUS at 10:09

## 2025-04-27 ASSESSMENT — PAIN DESCRIPTION - LOCATION: LOCATION: NECK

## 2025-04-27 ASSESSMENT — PAIN DESCRIPTION - PAIN TYPE: TYPE: ACUTE PAIN

## 2025-04-27 ASSESSMENT — PAIN DESCRIPTION - DESCRIPTORS: DESCRIPTORS: BURNING

## 2025-04-27 ASSESSMENT — PAIN SCALES - GENERAL: PAINLEVEL_OUTOF10: 3
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
## 2025-04-27 NOTE — ED PROVIDER NOTES
[facility]  EMERGENCY MEDICINE ATTENDING ATTESTATION      Evaluation of Tess Marley.   Case discussed and care plan developed with resident physician.   I agree with the nurse practitioner documentation and plan as documented by her, except if my documentation differs.   Patient seen, interviewed and examined by me.  I reviewed the medical, surgical, family and social history, medications and allergies.   I have reviewed and interpreted all available lab, radiology and ekg results available at the moment.  I have reviewed the nursing documentation.     Please see the resident physician completed note for final disposition except as documented on this attestation.   I have reviewed and interpreted all available lab, radiology and ekg results available at the moment.  Diagnosis, treatment and disposition plans were discussed and agreed upon by patient.   This transcription was electronically signed. It was dictated by use of voice recognition software and electronically transcribed. The transcription may contain errors not detected in proofreading.     I performed direct supervision and was present for the critical portion following procedures: None  Critical care time on this case: None    Electronically signed by Cristian Patino MD on 4/27/25 at 10:53 AM Cristian Chavez MD  04/27/25 0423    
  Diagnosis Date    Anxiety     Cancer (HCC)     Migraine      Past Surgical History:   Procedure Laterality Date    BREAST SURGERY      ENDOMETRIAL ABLATION  2019    SALPINGO-OOPHORECTOMY N/A 10/17/2024    LAPAROSCOPIC BILATERAL SALPINGO- OOPHORECTOMY performed by Addie Peterson MD at [facility] OR    TONSILLECTOMY      TUBAL LIGATION           MEDICATIONS   No current facility-administered medications for this encounter.    Current Outpatient Medications:     Abemaciclib (VERZENIO) 50 MG TABS, Take 50 mg by mouth 2 times daily, Disp: , Rfl:     albuterol sulfate HFA (VENTOLIN HFA) 108 (90 Base) MCG/ACT inhaler, Inhale 2 puffs into the lungs 4 times daily as needed for Wheezing, Disp: 54 g, Rfl: 1    traZODone (DESYREL) 50 MG tablet, Take 1 tablet by mouth nightly as needed, Disp: , Rfl:     venlafaxine (EFFEXOR XR) 37.5 MG extended release capsule, TAKE 1 CAPSULE BY MOUTH DAILY FOR 7 DAYS THEN TAKE 2 CAPSULES BY MOUTH DAILY, Disp: , Rfl:     amLODIPine (NORVASC) 5 MG tablet, Take 1 tablet by mouth daily, Disp: 90 tablet, Rfl: 3    letrozole (FEMARA) 2.5 MG tablet, Take 1 tablet by mouth daily, Disp: , Rfl:     Previous Medications    ABEMACICLIB (VERZENIO) 50 MG TABS    Take 50 mg by mouth 2 times daily    ALBUTEROL SULFATE HFA (VENTOLIN HFA) 108 (90 BASE) MCG/ACT INHALER    Inhale 2 puffs into the lungs 4 times daily as needed for Wheezing    AMLODIPINE (NORVASC) 5 MG TABLET    Take 1 tablet by mouth daily    LETROZOLE (FEMARA) 2.5 MG TABLET    Take 1 tablet by mouth daily    TRAZODONE (DESYREL) 50 MG TABLET    Take 1 tablet by mouth nightly as needed    VENLAFAXINE (EFFEXOR XR) 37.5 MG EXTENDED RELEASE CAPSULE    TAKE 1 CAPSULE BY MOUTH DAILY FOR 7 DAYS THEN TAKE 2 CAPSULES BY MOUTH DAILY         SOCIAL HISTORY     Social History     Social History Narrative    Not on file     Social History     Tobacco Use    Smoking status: Never    Smokeless tobacco: Never   Vaping Use    Vaping status: Never Used   Substance

## 2025-06-03 ENCOUNTER — HOSPITAL ENCOUNTER (EMERGENCY)
Age: 50
Discharge: HOME | End: 2025-06-03
Payer: COMMERCIAL

## 2025-06-03 ENCOUNTER — APPOINTMENT (OUTPATIENT)
Dept: GENERAL RADIOLOGY | Age: 50
End: 2025-06-03
Payer: COMMERCIAL

## 2025-06-03 VITALS
RESPIRATION RATE: 18 BRPM | WEIGHT: 200 LBS | SYSTOLIC BLOOD PRESSURE: 131 MMHG | BODY MASS INDEX: 30.31 KG/M2 | TEMPERATURE: 97.8 F | HEIGHT: 68 IN | OXYGEN SATURATION: 97 % | HEART RATE: 75 BPM | DIASTOLIC BLOOD PRESSURE: 67 MMHG

## 2025-06-03 DIAGNOSIS — J20.9 ACUTE BRONCHITIS, UNSPECIFIED ORGANISM: Primary | ICD-10-CM

## 2025-06-03 PROCEDURE — 6370000000 HC RX 637 (ALT 250 FOR IP)

## 2025-06-03 PROCEDURE — 94640 AIRWAY INHALATION TREATMENT: CPT

## 2025-06-03 PROCEDURE — 99213 OFFICE O/P EST LOW 20 MIN: CPT

## 2025-06-03 PROCEDURE — 71046 X-RAY EXAM CHEST 2 VIEWS: CPT

## 2025-06-03 RX ORDER — BENZONATATE 100 MG/1
100 CAPSULE ORAL 3 TIMES DAILY PRN
Qty: 21 CAPSULE | Refills: 0 | Status: SHIPPED | OUTPATIENT
Start: 2025-06-03 | End: 2025-06-10

## 2025-06-03 RX ORDER — GABAPENTIN 300 MG/1
300 CAPSULE ORAL ONCE
COMMUNITY

## 2025-06-03 RX ORDER — IPRATROPIUM BROMIDE AND ALBUTEROL SULFATE 2.5; .5 MG/3ML; MG/3ML
1 SOLUTION RESPIRATORY (INHALATION) ONCE
Status: COMPLETED | OUTPATIENT
Start: 2025-06-03 | End: 2025-06-03

## 2025-06-03 RX ORDER — PREDNISONE 20 MG/1
20 TABLET ORAL 2 TIMES DAILY
Qty: 10 TABLET | Refills: 0 | Status: SHIPPED | OUTPATIENT
Start: 2025-06-03 | End: 2025-06-08

## 2025-06-03 RX ADMIN — IPRATROPIUM BROMIDE AND ALBUTEROL SULFATE 1 DOSE: .5; 3 SOLUTION RESPIRATORY (INHALATION) at 17:41

## 2025-06-03 ASSESSMENT — PAIN - FUNCTIONAL ASSESSMENT: PAIN_FUNCTIONAL_ASSESSMENT: NONE - DENIES PAIN

## 2025-06-03 ASSESSMENT — ENCOUNTER SYMPTOMS
WHEEZING: 1
COUGH: 1
SHORTNESS OF BREATH: 1
CHEST TIGHTNESS: 1

## 2025-06-03 NOTE — ED PROVIDER NOTES
[facility] URGENT CARE  Urgent Care Encounter       CHIEF COMPLAINT       Chief Complaint   Patient presents with    Chest Congestion    Cough    Shortness of Breath    Wheezing       Nurses Notes reviewed and I agree except as noted in the HPI.  HISTORY OF PRESENT ILLNESS   Tess Marley is a 50 y.o. female who presents with complaints of cough, chest congestion, shortness of breath and wheezing. Pt reports that wheezing just started last night.     The history is provided by the patient.       REVIEW OF SYSTEMS     Review of Systems   Constitutional:  Positive for fatigue. Negative for fever.   Respiratory:  Positive for cough, chest tightness, shortness of breath and wheezing.    All other systems reviewed and are negative.      PAST MEDICAL HISTORY         Diagnosis Date    Anxiety     Cancer (HCC)     Migraine        SURGICALHISTORY     Patient  has a past surgical history that includes Tonsillectomy; Tubal ligation; Breast surgery; Endometrial ablation (2019); and Salpingo-oophorectomy (N/A, 10/17/2024).    CURRENT MEDICATIONS       Discharge Medication List as of 6/3/2025  6:09 PM        CONTINUE these medications which have NOT CHANGED    Details   gabapentin (NEURONTIN) 300 MG capsule Take 1 capsule by mouth 1 time.Historical Med      Abemaciclib (VERZENIO) 50 MG TABS Take 50 mg by mouth 2 times dailyHistorical Med      albuterol sulfate HFA (VENTOLIN HFA) 108 (90 Base) MCG/ACT inhaler Inhale 2 puffs into the lungs 4 times daily as needed for Wheezing, Disp-54 g, R-1Normal      traZODone (DESYREL) 50 MG tablet Take 1 tablet by mouth nightly as neededHistorical Med      venlafaxine (EFFEXOR XR) 37.5 MG extended release capsule TAKE 1 CAPSULE BY MOUTH DAILY FOR 7 DAYS THEN TAKE 2 CAPSULES BY MOUTH DAILYHistorical Med      amLODIPine (NORVASC) 5 MG tablet Take 1 tablet by mouth daily, Disp-90 tablet, R-3Normal      letrozole (FEMARA) 2.5 MG tablet Take 1 tablet by mouth dailyHistorical Med

## 2025-06-03 NOTE — DISCHARGE INSTRUCTIONS
Antibiotics  Steroids  Use inhaler  Tessalon perles  Follow up with PCP as needed  ER if symptoms worsen

## 2025-06-03 NOTE — ED TRIAGE NOTES
Patient to UC for shortness of breath, chest congestion, and dry cough. States symptoms started 5/29. States she has taken nyquil, robitussin, and tessalon with no relief.

## 2025-06-15 ENCOUNTER — APPOINTMENT (OUTPATIENT)
Dept: GENERAL RADIOLOGY | Age: 50
End: 2025-06-15
Payer: COMMERCIAL

## 2025-06-15 ENCOUNTER — HOSPITAL ENCOUNTER (EMERGENCY)
Age: 50
Discharge: HOME | End: 2025-06-15
Payer: COMMERCIAL

## 2025-06-15 VITALS
BODY MASS INDEX: 31.39 KG/M2 | OXYGEN SATURATION: 99 % | SYSTOLIC BLOOD PRESSURE: 139 MMHG | HEART RATE: 72 BPM | HEIGHT: 67 IN | RESPIRATION RATE: 17 BRPM | DIASTOLIC BLOOD PRESSURE: 92 MMHG | WEIGHT: 200 LBS | TEMPERATURE: 97.9 F

## 2025-06-15 DIAGNOSIS — M25.562 ACUTE PAIN OF LEFT KNEE: Primary | ICD-10-CM

## 2025-06-15 PROCEDURE — 99283 EMERGENCY DEPT VISIT LOW MDM: CPT

## 2025-06-15 PROCEDURE — 73564 X-RAY EXAM KNEE 4 OR MORE: CPT

## 2025-06-15 PROCEDURE — 73590 X-RAY EXAM OF LOWER LEG: CPT

## 2025-06-15 ASSESSMENT — PAIN - FUNCTIONAL ASSESSMENT: PAIN_FUNCTIONAL_ASSESSMENT: 0-10

## 2025-06-15 ASSESSMENT — PAIN SCALES - GENERAL: PAINLEVEL_OUTOF10: 8

## 2025-06-15 ASSESSMENT — PAIN DESCRIPTION - ORIENTATION: ORIENTATION: LEFT

## 2025-06-15 ASSESSMENT — PAIN DESCRIPTION - LOCATION: LOCATION: LEG

## 2025-06-15 NOTE — DISCHARGE INSTRUCTIONS
Please utilize rest, ice, compression, elevation to decrease swelling.  Please utilize knee brace for stability.    Please call the orthopedic department tomorrow morning to set up further evaluation of a ligament or meniscus injury.    Return if symptoms worsen or you develop numbness, tingling, decrease sensation of the legs or inability to walk.

## 2025-06-15 NOTE — ED PROVIDER NOTES
[facility] EMERGENCY DEPARTMENT      EMERGENCY MEDICINE     Pt Name: Tess Marley  MRN: [id number]  Birthdate 1975  Date of evaluation: 6/15/2025  Provider: Ayse Hickey PA-C    CHIEF COMPLAINT       Chief Complaint   Patient presents with    Leg Pain     HISTORY OF PRESENT ILLNESS   Tess Marley is a pleasant 50 y.o. female with past medical history of anxiety, cancer, and migraine who presents to the emergency department from home for evaluation of left leg pain.  Patient states that yesterday evening she was walking with her crocs and tripped over some.  She denies hearing a \"pop \".  Patient localizes her pain to the lateral aspect of her left knee, lower leg, and distal thigh.  Patient states that she is able to ambulate secondary to pain.  She reports taking 1 dose of ibuprofen last night before going to bed.  She reports her pain is a 8/10 currently.  She denies any previous surgeries on her left leg.  She denies any acute numbness or tingling, but she states she does have chronic neuropathy and both lower extremities.  She has no other issues or concerns    PASTMEDICAL HISTORY     Past Medical History:   Diagnosis Date    Anxiety     Cancer (HCC)     Migraine        Patient Active Problem List   Diagnosis Code    Breast cancer metastasized to axillary lymph node, left ([facility]) C50.912, C77.3     SURGICAL HISTORY       Past Surgical History:   Procedure Laterality Date    BREAST SURGERY      ENDOMETRIAL ABLATION  2019    SALPINGO-OOPHORECTOMY N/A 10/17/2024    LAPAROSCOPIC BILATERAL SALPINGO- OOPHORECTOMY performed by Addie Peterson MD at [facility] OR    TONSILLECTOMY      TUBAL LIGATION         CURRENT MEDICATIONS       Discharge Medication List as of 6/15/2025  9:56 AM        CONTINUE these medications which have NOT CHANGED    Details   gabapentin (NEURONTIN) 300 MG capsule Take 1 capsule by mouth 1 time.Historical Med      Abemaciclib (VERZENIO) 50 MG TABS Take 50 mg by mouth 2 times

## 2025-06-15 NOTE — ED NOTES
Pt presents to the ED with complaints of L leg pain. Pt report she slipped on water yesterday at her house and her leg turned an abnormal way. Pt reports she has been having a difficult time walking since. Pt obvious injury noted.

## 2025-06-19 ENCOUNTER — OFFICE VISIT (OUTPATIENT)
Dept: CARDIOLOGY CLINIC | Age: 50
End: 2025-06-19
Payer: COMMERCIAL

## 2025-06-19 VITALS
DIASTOLIC BLOOD PRESSURE: 80 MMHG | WEIGHT: 203 LBS | BODY MASS INDEX: 31.86 KG/M2 | HEART RATE: 80 BPM | HEIGHT: 67 IN | SYSTOLIC BLOOD PRESSURE: 132 MMHG

## 2025-06-19 DIAGNOSIS — I10 PRIMARY HYPERTENSION: Primary | ICD-10-CM

## 2025-06-19 PROCEDURE — 99213 OFFICE O/P EST LOW 20 MIN: CPT | Performed by: NUCLEAR MEDICINE

## 2025-06-19 PROCEDURE — 3075F SYST BP GE 130 - 139MM HG: CPT | Performed by: NUCLEAR MEDICINE

## 2025-06-19 PROCEDURE — 3079F DIAST BP 80-89 MM HG: CPT | Performed by: NUCLEAR MEDICINE

## 2025-06-19 NOTE — PROGRESS NOTES
[facility] [place] [facility]  [facility] CARDIOLOGY  [address]  [address]  [place] [address]  Dept: [phone]  Dept Fax: [phone]  Loc: [phone]    Visit Date: 6/19/2025    Tess Marley is a 50 y.o. female who presents todayfor:  Chief Complaint   Patient presents with    Follow-up    Hypertension     Seen initially for chest pain  Work up was all good  Breast cancer s/p chemo and in remission   Chest pain is better  No changes in breathing  Bp is stable  No dizziness  No syncope      HPI:  HPI  Past Medical History:   Diagnosis Date    Anxiety     Cancer (HCC)     Migraine       Past Surgical History:   Procedure Laterality Date    BREAST SURGERY      ENDOMETRIAL ABLATION  2019    SALPINGO-OOPHORECTOMY N/A 10/17/2024    LAPAROSCOPIC BILATERAL SALPINGO- OOPHORECTOMY performed by Addie Peterson MD at [facility] OR    TONSILLECTOMY      TUBAL LIGATION       Family History   Problem Relation Age of Onset    Cancer Mother         Cervical    Cancer Paternal Aunt         Lung    Cancer Paternal Uncle         Kidney    Cancer Maternal Grandmother         Lymphoma    Breast Cancer Paternal Grandmother      Social History     Tobacco Use    Smoking status: Never    Smokeless tobacco: Never   Substance Use Topics    Alcohol use: Yes     Comment: socially      Current Outpatient Medications   Medication Sig Dispense Refill    gabapentin (NEURONTIN) 300 MG capsule Take 1 capsule by mouth 1 time.      Abemaciclib (VERZENIO) 50 MG TABS Take 50 mg by mouth 2 times daily      albuterol sulfate HFA (VENTOLIN HFA) 108 (90 Base) MCG/ACT inhaler Inhale 2 puffs into the lungs 4 times daily as needed for Wheezing 54 g 1    traZODone (DESYREL) 50 MG tablet Take 1 tablet by mouth nightly as needed      venlafaxine (EFFEXOR XR) 37.5 MG extended release capsule TAKE 1 CAPSULE BY MOUTH DAILY FOR 7 DAYS THEN TAKE 2 CAPSULES BY MOUTH DAILY      amLODIPine (NORVASC) 5 MG tablet Take 1 tablet by mouth

## 2025-06-19 NOTE — PROGRESS NOTES
Patient here for follow up.  Patient states BP going up and down.  Patient states having a lot of stress in life.

## (undated) DEVICE — SUTURE VICRYL + SZ 4-0 L27IN ABSRB WHT FS-2 3/8 CIR REV CUT VCP422H

## (undated) DEVICE — SEALER LAP L37CM MARYLAND JAW OPN NANO COAT MULTIFUNCTIONAL

## (undated) DEVICE — TROCAR: Brand: KII FIOS FIRST ENTRY

## (undated) DEVICE — SOLUTION PREP PAINT POV IOD FOR SKIN MUCOUS MEM

## (undated) DEVICE — SYRINGE CATH TIP 50ML

## (undated) DEVICE — SOLUTION IRRIG 1500ML STRL H2O USP POUR PLAS BTL

## (undated) DEVICE — SOLUTION SCRB 4OZ 4% CHG H2O AIDED FOR PREOPERATIVE SKIN

## (undated) DEVICE — SUTURE VICRYL + SZ 2-0 L27IN ABSRB VLT UR-6 5/8 CIR TAPR PNT VCP602H

## (undated) DEVICE — GYN LAPAROSCOPY: Brand: MEDLINE INDUSTRIES, INC.

## (undated) DEVICE — CATHETER,URETHRAL,REDRUBBER,STRL,14FR: Brand: MEDLINE INDUSTRIES, INC.

## (undated) DEVICE — GLOVE SURG SZ 7 L12IN FNGR THK79MIL GRN LTX FREE

## (undated) DEVICE — SYRINGE MED 30ML STD CLR PLAS LUERLOCK TIP N CTRL DISP

## (undated) DEVICE — TISSUE RETRIEVAL SYSTEM: Brand: INZII RETRIEVAL SYSTEM

## (undated) DEVICE — MASTISOL ADHESIVE LIQ 2/3ML

## (undated) DEVICE — PACK,LAPAROSCOPY,PK II,SIRUS: Brand: MEDLINE

## (undated) DEVICE — STRIP,CLOSURE,WOUND,MEDI-STRIP,1/2X4: Brand: MEDLINE